# Patient Record
Sex: MALE | Race: OTHER | NOT HISPANIC OR LATINO
[De-identification: names, ages, dates, MRNs, and addresses within clinical notes are randomized per-mention and may not be internally consistent; named-entity substitution may affect disease eponyms.]

---

## 2017-10-24 ENCOUNTER — APPOINTMENT (OUTPATIENT)
Dept: ENDOCRINOLOGY | Facility: CLINIC | Age: 82
End: 2017-10-24
Payer: MEDICARE

## 2017-10-24 VITALS
SYSTOLIC BLOOD PRESSURE: 116 MMHG | WEIGHT: 189.25 LBS | HEIGHT: 68 IN | HEART RATE: 60 BPM | BODY MASS INDEX: 28.68 KG/M2 | DIASTOLIC BLOOD PRESSURE: 74 MMHG

## 2017-10-24 DIAGNOSIS — I72.3 ANEURYSM OF ILIAC ARTERY: ICD-10-CM

## 2017-10-24 PROCEDURE — 99204 OFFICE O/P NEW MOD 45 MIN: CPT | Mod: 25

## 2017-10-24 PROCEDURE — 36415 COLL VENOUS BLD VENIPUNCTURE: CPT

## 2017-11-03 LAB
24R-OH-CALCIDIOL SERPL-MCNC: 48.6 PG/ML
25(OH)D3 SERPL-MCNC: 30.8 NG/ML
ALBUMIN SERPL ELPH-MCNC: 4 G/DL
ALP BLD-CCNC: 104 U/L
ALP BONE SERPL-MCNC: 26 MCG/L
ALT SERPL-CCNC: 17 U/L
ANION GAP SERPL CALC-SCNC: 13 MMOL/L
AST SERPL-CCNC: 24 U/L
BILIRUB SERPL-MCNC: 0.3 MG/DL
BUN SERPL-MCNC: 40 MG/DL
CALCIUM SERPL-MCNC: 10.8 MG/DL
CALCIUM SERPL-MCNC: 10.8 MG/DL
CHLORIDE SERPL-SCNC: 105 MMOL/L
CO2 SERPL-SCNC: 22 MMOL/L
CREAT SERPL-MCNC: 1.5 MG/DL
GLUCOSE SERPL-MCNC: 86 MG/DL
MAGNESIUM SERPL-MCNC: 2.3 MG/DL
PARATHYROID HORMONE INTACT: 94 PG/ML
PHOSPHATE SERPL-MCNC: 3 MG/DL
POTASSIUM SERPL-SCNC: 4.8 MMOL/L
PROT SERPL-MCNC: 7.5 G/DL
PTH RELATED PROT SERPL-MCNC: <1.1 PMOL/L
SODIUM SERPL-SCNC: 140 MMOL/L
T3 SERPL-MCNC: 107 NG/DL
T4 FREE SERPL-MCNC: 1.1 NG/DL
TSH SERPL-ACNC: 2.34 UIU/ML

## 2017-12-14 ENCOUNTER — FORM ENCOUNTER (OUTPATIENT)
Age: 82
End: 2017-12-14

## 2017-12-15 ENCOUNTER — OUTPATIENT (OUTPATIENT)
Dept: OUTPATIENT SERVICES | Facility: HOSPITAL | Age: 82
LOS: 1 days | End: 2017-12-15
Payer: MEDICARE

## 2017-12-15 PROCEDURE — 77081 DXA BONE DENSITY APPENDICULR: CPT

## 2017-12-15 PROCEDURE — 77080 DXA BONE DENSITY AXIAL: CPT

## 2017-12-15 PROCEDURE — 77080 DXA BONE DENSITY AXIAL: CPT | Mod: 26

## 2017-12-20 ENCOUNTER — LABORATORY RESULT (OUTPATIENT)
Age: 82
End: 2017-12-20

## 2017-12-21 ENCOUNTER — APPOINTMENT (OUTPATIENT)
Dept: NEPHROLOGY | Facility: CLINIC | Age: 82
End: 2017-12-21
Payer: MEDICARE

## 2017-12-21 ENCOUNTER — APPOINTMENT (OUTPATIENT)
Dept: ENDOCRINOLOGY | Facility: CLINIC | Age: 82
End: 2017-12-21
Payer: MEDICARE

## 2017-12-21 VITALS — SYSTOLIC BLOOD PRESSURE: 109 MMHG | DIASTOLIC BLOOD PRESSURE: 66 MMHG

## 2017-12-21 VITALS — DIASTOLIC BLOOD PRESSURE: 56 MMHG | HEART RATE: 67 BPM | SYSTOLIC BLOOD PRESSURE: 95 MMHG

## 2017-12-21 VITALS
DIASTOLIC BLOOD PRESSURE: 76 MMHG | HEART RATE: 54 BPM | WEIGHT: 193 LBS | BODY MASS INDEX: 29.35 KG/M2 | SYSTOLIC BLOOD PRESSURE: 128 MMHG

## 2017-12-21 VITALS — SYSTOLIC BLOOD PRESSURE: 99 MMHG | HEART RATE: 66 BPM | DIASTOLIC BLOOD PRESSURE: 56 MMHG

## 2017-12-21 PROCEDURE — 99204 OFFICE O/P NEW MOD 45 MIN: CPT | Mod: 25

## 2017-12-21 PROCEDURE — 99214 OFFICE O/P EST MOD 30 MIN: CPT

## 2017-12-21 PROCEDURE — 36415 COLL VENOUS BLD VENIPUNCTURE: CPT

## 2017-12-22 LAB
25(OH)D3 SERPL-MCNC: 32.6 NG/ML
ALBUMIN SERPL ELPH-MCNC: 4.2 G/DL
ALP BLD-CCNC: 103 U/L
ALT SERPL-CCNC: 17 U/L
ANION GAP SERPL CALC-SCNC: 15 MMOL/L
APPEARANCE: CLEAR
AST SERPL-CCNC: 21 U/L
BASOPHILS # BLD AUTO: 0.01 K/UL
BASOPHILS NFR BLD AUTO: 0.2 %
BILIRUB SERPL-MCNC: 0.4 MG/DL
BILIRUBIN URINE: NEGATIVE
BLOOD URINE: NEGATIVE
BUN SERPL-MCNC: 38 MG/DL
CALCIUM SERPL-MCNC: 10.4 MG/DL
CALCIUM SERPL-MCNC: 10.4 MG/DL
CHLORIDE SERPL-SCNC: 106 MMOL/L
CO2 SERPL-SCNC: 20 MMOL/L
COLOR: YELLOW
CREAT SERPL-MCNC: 1.82 MG/DL
CREAT SPEC-SCNC: 108 MG/DL
EOSINOPHIL # BLD AUTO: 0.19 K/UL
EOSINOPHIL NFR BLD AUTO: 3.8
GLUCOSE QUALITATIVE U: NEGATIVE MG/DL
GLUCOSE SERPL-MCNC: 98 MG/DL
HCT VFR BLD CALC: 39 %
HGB BLD-MCNC: 12.4 G/DL
IMM GRANULOCYTES NFR BLD AUTO: 0.2 %
KETONES URINE: NEGATIVE
LEUKOCYTE ESTERASE URINE: NEGATIVE
LYMPHOCYTES # BLD AUTO: 0.92 K/UL
LYMPHOCYTES NFR BLD AUTO: 18.4 %
M PROTEIN SPEC IFE-MCNC: NORMAL
MAGNESIUM SERPL-MCNC: 2.3 MG/DL
MAN DIFF?: NORMAL
MCHC RBC-ENTMCNC: 29.6 PG
MCHC RBC-ENTMCNC: 31.8 GM/DL
MCV RBC AUTO: 93.1 FL
MICROALBUMIN 24H UR DL<=1MG/L-MCNC: 5.7 MG/DL
MICROALBUMIN/CREAT 24H UR-RTO: 53 MG/G
MONOCYTES # BLD AUTO: 0.51 K/UL
MONOCYTES NFR BLD AUTO: 10.2 %
NEUTROPHILS # BLD AUTO: 3.35 K/UL
NEUTROPHILS NFR BLD AUTO: 67.2 %
NITRITE URINE: NEGATIVE
PARATHYROID HORMONE INTACT: 100 PG/ML
PH URINE: 5
PHOSPHATE SERPL-MCNC: 2.6 MG/DL
PLATELET # BLD AUTO: 190 K/UL
POTASSIUM SERPL-SCNC: 4.7 MMOL/L
PROT SERPL-MCNC: 7.1 G/DL
PROTEIN URINE: NEGATIVE MG/DL
RBC # BLD: 4.19 M/UL
RBC # FLD: 14.1 %
SODIUM SERPL-SCNC: 141 MMOL/L
SPECIFIC GRAVITY URINE: 1.02
URATE SERPL-MCNC: 7.4 MG/DL
UROBILINOGEN URINE: NEGATIVE MG/DL
VIT B12 SERPL-MCNC: 442 PG/ML
WBC # FLD AUTO: 4.99 K/UL

## 2017-12-23 LAB — IGA 24H UR QL IFE: NORMAL

## 2018-01-09 ENCOUNTER — APPOINTMENT (OUTPATIENT)
Dept: NEPHROLOGY | Facility: CLINIC | Age: 83
End: 2018-01-09

## 2018-12-05 ENCOUNTER — APPOINTMENT (OUTPATIENT)
Dept: NEPHROLOGY | Facility: CLINIC | Age: 83
End: 2018-12-05
Payer: MEDICARE

## 2018-12-05 VITALS — SYSTOLIC BLOOD PRESSURE: 123 MMHG | DIASTOLIC BLOOD PRESSURE: 72 MMHG

## 2018-12-05 VITALS — SYSTOLIC BLOOD PRESSURE: 107 MMHG | HEART RATE: 54 BPM | DIASTOLIC BLOOD PRESSURE: 56 MMHG

## 2018-12-05 PROCEDURE — 99214 OFFICE O/P EST MOD 30 MIN: CPT

## 2019-06-11 ENCOUNTER — APPOINTMENT (OUTPATIENT)
Dept: NEPHROLOGY | Facility: CLINIC | Age: 84
End: 2019-06-11
Payer: MEDICARE

## 2019-06-11 VITALS — SYSTOLIC BLOOD PRESSURE: 98 MMHG | DIASTOLIC BLOOD PRESSURE: 72 MMHG | HEART RATE: 59 BPM

## 2019-06-11 PROCEDURE — 99214 OFFICE O/P EST MOD 30 MIN: CPT | Mod: 25

## 2019-06-11 RX ORDER — APIXABAN 2.5 MG/1
2.5 TABLET, FILM COATED ORAL
Refills: 0 | Status: ACTIVE | COMMUNITY

## 2019-06-11 NOTE — HISTORY OF PRESENT ILLNESS
[FreeTextEntry1] : * Labs reviewed from 5/28. CKD stable, creatinine 1.65. * HTN controlled. No lightheadedness. Compliant with medications. Lisinopril had been increased to 10 mg.* In April 2019 he had a CVA and was started on eliquis. This was not reportedly caused by a berry aneurysm. He has slight aphasia. \par \par Previous history (42Prx24): * Labs from [facility] reviewed. Creatinine 1.62 in 11/18. * Borderline hypercalcemia, calcium 10.5. 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 * HTN controlled. No lightheadedness. Compliant with medications. Following low salt diet.\par \par Previous history (63Rwx19): Referred by Dr. Gardiner for CKD. Found to have creatinine of 1.5 (GFR 43) on October 24, 2017. No NSAIDS. 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 A CT scan in 2015 revealed bilateral enlarged kidneys with innumerable cysts. His uncles (father's side) and nieces have had a similar issue. He is unsure whether his father had kidney disease. * His PTH was 94 with a calcium of 10.8 and he is being evaluated by Dr. Gardiner and Dr. Cope. HTN controlled on lisinopril 20. No lightheadedness. \par \par PCP: Dr. Chavez Lopez

## 2019-06-11 NOTE — PHYSICAL EXAM
[General Appearance - Alert] : alert [General Appearance - In No Acute Distress] : in no acute distress [Sclera] : the sclera and conjunctiva were normal [PERRL With Normal Accommodation] : pupils were equal in size, round, and reactive to light [Oropharynx] : the oropharynx was normal [Outer Ear] : the ears and nose were normal in appearance [Extraocular Movements] : extraocular movements were intact [Neck Appearance] : the appearance of the neck was normal [Neck Cervical Mass (___cm)] : no neck mass was observed [Jugular Venous Distention Increased] : there was no jugular-venous distention [Thyroid Diffuse Enlargement] : the thyroid was not enlarged [Thyroid Nodule] : there were no palpable thyroid nodules [Auscultation Breath Sounds / Voice Sounds] : lungs were clear to auscultation bilaterally [Heart Rate And Rhythm] : heart rate was normal and rhythm regular [Heart Sounds] : normal S1 and S2 [Heart Sounds Pericardial Friction Rub] : no pericardial rub [Heart Sounds Gallop] : no gallops [Murmurs] : no murmurs [Edema] : there was no peripheral edema [Bowel Sounds] : normal bowel sounds [Veins - Varicosity Changes] : there were no varicosital changes [Abdomen Soft] : soft [Abdomen Mass (___ Cm)] : no abdominal mass palpated [Abdomen Tenderness] : non-tender [Cervical Lymph Nodes Enlarged Anterior Bilaterally] : anterior cervical [Cervical Lymph Nodes Enlarged Posterior Bilaterally] : posterior cervical [Supraclavicular Lymph Nodes Enlarged Bilaterally] : supraclavicular [Nail Clubbing] : no clubbing  or cyanosis of the fingernails [Abnormal Walk] : normal gait [Motor Tone] : muscle strength and tone were normal [Skin Color & Pigmentation] : normal skin color and pigmentation [Musculoskeletal - Swelling] : no joint swelling seen [Skin Turgor] : normal skin turgor [] : no rash [Impaired Insight] : insight and judgment were intact [Affect] : the affect was normal [Oriented To Time, Place, And Person] : oriented to person, place, and time

## 2019-06-11 NOTE — ASSESSMENT
[FreeTextEntry1] : # HTN controlled. (Below target.)\par * Decrease lisinopril to 5.\par * The patient's blood pressure was checked with the Omron [id number]-[id number] using the SPRINT trial protocol after sitting quietly in an empty room with arm supported, back supported, and feet on the floor for 5 minutes. The average of 3 readings were taken. \par * The patient has been advised to check their BP at home with an automatic arm cuff, write down the readings, and reach me directly on the phone immediately if they are persistently > 180 systolic or if SBP is less than 100 or if lightheadedness develops. They were advised to bring in all blood pressure readings and medications next visit.\par * The patient has been counseled that they have a a significant medical condition and regular office followup (at least every 4 months for now) is essential for monitoring, and that it is their responsibility to make follow up appointments.\par * The patient also has been counseled that they must never stop or change any medications without discussing this with me (or another physician). \par * A counseling information sheet has been given and they were provided sufficient time to review this sheet. All their questions were answered.\par \par # CKD stable.\par * The patient has been counseled that chronic kidney disease is a significant condition and regular office followup with me (at least every 4 months for now) is essential for monitoring, and that it is their responsibility to make a follow up appointment.\par * The patient has been counseled never to stop taking their medications without discussing it with me or another doctor.\par * The patient has been counseled on risk of acute renal failure and instructed to immediately call and speak with me or go immediately to ER with any severe symptoms, nausea, vomiting, diarrhea, chest pain, or shortness of breath.\par * The patient has been counseled on avoiding NSAIDs.\par * Reducing or avoiding red meat, following a relatively low oxalate diet, and starting folate 800 mg qd has been advised.\par * A counseling information sheet has been given and they were provided sufficient time to review this. All their questions were answered.\par \par # CVA.\par * Follow up with neurology.\par * Eliquis continued. \par \par # Hyperpara.\par * Recheck PTH next visit.

## 2019-06-11 NOTE — CONSULT LETTER
[FreeTextEntry1] : Dear Chavez,\par \par I had the pleasure of seeing Kenan Mcmahon in followup for kidney disease. My note is attached.\par \par Thank you for the opportunity to participate in the care of your patient.\par \par Please call me on my cell phone at [phone] or in the office at [phone] if you have any questions.\par \par Best regards,\par \par Fran\par \par Fran Avalos MD, FACP, FASN\par [email or web address]\par Office: [phone]\par Mobile: [phone]

## 2019-10-10 ENCOUNTER — APPOINTMENT (OUTPATIENT)
Dept: NEPHROLOGY | Facility: CLINIC | Age: 84
End: 2019-10-10
Payer: MEDICARE

## 2019-10-10 VITALS — SYSTOLIC BLOOD PRESSURE: 112 MMHG | DIASTOLIC BLOOD PRESSURE: 63 MMHG

## 2019-10-10 VITALS — DIASTOLIC BLOOD PRESSURE: 56 MMHG | SYSTOLIC BLOOD PRESSURE: 103 MMHG | HEART RATE: 54 BPM

## 2019-10-10 PROCEDURE — 99214 OFFICE O/P EST MOD 30 MIN: CPT

## 2019-10-10 NOTE — HISTORY OF PRESENT ILLNESS
[FreeTextEntry1] : He is here with his wife who was also provided cousneling. \par \par * CKD slowly progressive, creatinine 1.84 on [id number]. * HTN controlled. No lightheadedness. Compliant with medications. * Calcium decreased to 10. . \par \par Previous history (11Jun19): * Labs reviewed from 5/28. CKD stable, creatinine 1.65. * HTN controlled. No lightheadedness. Compliant with medications. Lisinopril had been increased to 10 mg.* In April 2019 he had a CVA and was started on eliquis. This was not reportedly caused by a berry aneurysm. He has slight aphasia. \par \par Previous history (37Vzf57): * Labs from [facility] reviewed. Creatinine 1.62 in 11/18. * Borderline hypercalcemia, calcium 10.5. 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 * HTN controlled. No lightheadedness. Compliant with medications. Following low salt diet.\par \par Previous history (70Zvn50): Referred by Dr. Gardiner for CKD. Found to have creatinine of 1.5 (GFR 43) on October 24, 2017. No NSAIDS. 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 A CT scan in 2015 revealed bilateral enlarged kidneys with innumerable cysts. His uncles (father's side) and nieces have had a similar issue. He is unsure whether his father had kidney disease. * His PTH was 94 with a calcium of 10.8 and he is being evaluated by Dr. Gardiner and Dr. Cope. HTN controlled on lisinopril 20. No lightheadedness. \par \par PCP: Dr. Chavez Lopez

## 2019-10-10 NOTE — PHYSICAL EXAM
[General Appearance - Alert] : alert [General Appearance - In No Acute Distress] : in no acute distress [Sclera] : the sclera and conjunctiva were normal [PERRL With Normal Accommodation] : pupils were equal in size, round, and reactive to light [Extraocular Movements] : extraocular movements were intact [Outer Ear] : the ears and nose were normal in appearance [Oropharynx] : the oropharynx was normal [Neck Appearance] : the appearance of the neck was normal [Neck Cervical Mass (___cm)] : no neck mass was observed [Jugular Venous Distention Increased] : there was no jugular-venous distention [Thyroid Diffuse Enlargement] : the thyroid was not enlarged [Thyroid Nodule] : there were no palpable thyroid nodules [Auscultation Breath Sounds / Voice Sounds] : lungs were clear to auscultation bilaterally [Heart Rate And Rhythm] : heart rate was normal and rhythm regular [Heart Sounds Gallop] : no gallops [Heart Sounds] : normal S1 and S2 [Murmurs] : no murmurs [Heart Sounds Pericardial Friction Rub] : no pericardial rub [Edema] : there was no peripheral edema [Veins - Varicosity Changes] : there were no varicosital changes [Bowel Sounds] : normal bowel sounds [Abdomen Soft] : soft [Abdomen Tenderness] : non-tender [Abdomen Mass (___ Cm)] : no abdominal mass palpated [Cervical Lymph Nodes Enlarged Posterior Bilaterally] : posterior cervical [Cervical Lymph Nodes Enlarged Anterior Bilaterally] : anterior cervical [Supraclavicular Lymph Nodes Enlarged Bilaterally] : supraclavicular [Abnormal Walk] : normal gait [Nail Clubbing] : no clubbing  or cyanosis of the fingernails [Musculoskeletal - Swelling] : no joint swelling seen [Motor Tone] : muscle strength and tone were normal [Skin Turgor] : normal skin turgor [Skin Color & Pigmentation] : normal skin color and pigmentation [] : no rash [Oriented To Time, Place, And Person] : oriented to person, place, and time [Impaired Insight] : insight and judgment were intact [Affect] : the affect was normal

## 2019-10-10 NOTE — ASSESSMENT
[FreeTextEntry1] : # HTN controlled.\par * The patient's blood pressure was checked with the Omron [id number]-[id number] using the SPRINT trial protocol after sitting quietly in an empty room with arm supported, back supported, and feet on the floor for 5 minutes. The average of 3 readings were taken. \par * The patient has been advised to check their BP at home with an automatic arm cuff, write down the readings, and reach me directly on the phone immediately if they are persistently > 180 systolic or if SBP is less than 100 or if lightheadedness develops. They were advised to bring in all blood pressure readings and medications next visit.\par * The patient has been counseled that they have a a significant medical condition and regular office followup (at least every 4 months for now) is essential for monitoring, and that it is their responsibility to make follow up appointments.\par * The patient also has been counseled that they must never stop or change any medications without discussing this with me (or another physician). \par * A counseling information sheet has been given and they were provided sufficient time to review this sheet. All their questions were answered.\par \par # CKD stage 3 stable.\par * The patient has been counseled that chronic kidney disease is a significant condition and regular office followup with me (at least every 4 months for now) is essential for monitoring, and that it is their responsibility to make a follow up appointment.\par * The patient has been counseled never to stop taking their medications without discussing it with me or another doctor.\par * The patient has been counseled on risk of acute renal failure and instructed to immediately call and speak with me or go immediately to ER with any severe symptoms, nausea, vomiting, diarrhea, chest pain, or shortness of breath.\par * The patient has been counseled on avoiding NSAIDs.\par * Reducing or avoiding red meat, following a relatively low oxalate diet, and starting folate 800 mg qd has been advised.\par * A counseling information sheet has been given and they were provided sufficient time to review this. All their questions were answered.\par \par # Hyperparathyroidism.\par * Follow PTH.

## 2020-01-27 ENCOUNTER — APPOINTMENT (OUTPATIENT)
Dept: NEPHROLOGY | Facility: CLINIC | Age: 85
End: 2020-01-27

## 2020-02-03 ENCOUNTER — APPOINTMENT (OUTPATIENT)
Dept: NEPHROLOGY | Facility: CLINIC | Age: 85
End: 2020-02-03
Payer: MEDICARE

## 2020-02-03 VITALS — DIASTOLIC BLOOD PRESSURE: 64 MMHG | SYSTOLIC BLOOD PRESSURE: 108 MMHG | HEART RATE: 50 BPM

## 2020-02-03 VITALS — WEIGHT: 180.25 LBS | BODY MASS INDEX: 27.32 KG/M2 | HEIGHT: 68 IN

## 2020-02-03 VITALS — SYSTOLIC BLOOD PRESSURE: 125 MMHG | HEART RATE: 55 BPM | DIASTOLIC BLOOD PRESSURE: 73 MMHG

## 2020-02-03 PROCEDURE — 99214 OFFICE O/P EST MOD 30 MIN: CPT

## 2020-02-03 NOTE — PHYSICAL EXAM
[General Appearance - Alert] : alert [General Appearance - In No Acute Distress] : in no acute distress [PERRL With Normal Accommodation] : pupils were equal in size, round, and reactive to light [Sclera] : the sclera and conjunctiva were normal [Extraocular Movements] : extraocular movements were intact [Oropharynx] : the oropharynx was normal [Outer Ear] : the ears and nose were normal in appearance [Neck Cervical Mass (___cm)] : no neck mass was observed [Neck Appearance] : the appearance of the neck was normal [Jugular Venous Distention Increased] : there was no jugular-venous distention [Thyroid Nodule] : there were no palpable thyroid nodules [Thyroid Diffuse Enlargement] : the thyroid was not enlarged [Heart Rate And Rhythm] : heart rate was normal and rhythm regular [Auscultation Breath Sounds / Voice Sounds] : lungs were clear to auscultation bilaterally [Murmurs] : no murmurs [Heart Sounds Gallop] : no gallops [Heart Sounds Pericardial Friction Rub] : no pericardial rub [Heart Sounds] : normal S1 and S2 [Edema] : there was no peripheral edema [Veins - Varicosity Changes] : there were no varicosital changes [Bowel Sounds] : normal bowel sounds [Abdomen Soft] : soft [Abdomen Mass (___ Cm)] : no abdominal mass palpated [Abdomen Tenderness] : non-tender [Cervical Lymph Nodes Enlarged Posterior Bilaterally] : posterior cervical [Supraclavicular Lymph Nodes Enlarged Bilaterally] : supraclavicular [Cervical Lymph Nodes Enlarged Anterior Bilaterally] : anterior cervical [Nail Clubbing] : no clubbing  or cyanosis of the fingernails [Abnormal Walk] : normal gait [Musculoskeletal - Swelling] : no joint swelling seen [Motor Tone] : muscle strength and tone were normal [Skin Color & Pigmentation] : normal skin color and pigmentation [Skin Turgor] : normal skin turgor [] : no rash [Oriented To Time, Place, And Person] : oriented to person, place, and time [Impaired Insight] : insight and judgment were intact [Affect] : the affect was normal

## 2020-02-03 NOTE — CONSULT LETTER
[FreeTextEntry1] : Dear Chavez,\par \par I had the pleasure of seeing Kenan Mcmahon in followup for kidney disease. My note is attached.\par \par Thank you for the opportunity to participate in the care of your patient.\par \par Please call me on my cell phone at [phone] or in the office at [phone] if you have any questions.\par \par Best regards,\par \par Fran\par \par Fran Avalos MD, FACP, FASN\par [email or web address]\par Office: [phone]\par Mobile: [phone]

## 2020-02-03 NOTE — ASSESSMENT
[FreeTextEntry1] : # HTN controlled.\par * The patient's blood pressure was checked with the Omron [id number]-[id number] using the SPRINT trial protocol after sitting quietly in an empty room with arm supported, back supported, and feet on the floor for 5 minutes. The average of 3 readings were taken. \par * A counseling information sheet had been given and they were provided sufficient time to review this sheet. All their questions were answered.\par * The patient has been counseled to check their BP at home with an automatic arm cuff, write down the readings, and reach me directly on the phone immediately if they are persistently > 180 systolic or if SBP is less than 100 or if lightheadedness develops. They were counseled to bring in all blood pressure readings and medications next visit.\par * The patient has been counseled that they have a a significant medical condition and regular office followup (at least every 4 months for now) is essential for monitoring, and that it is their responsibility to make follow up appointments.\par * The patient also has been counseled that they must never stop or change any medications without discussing this with me (or another physician). \par \par # CKD stage 3 stable.\par * The patient has been counseled that chronic kidney disease is a significant condition and regular office followup with me (at least every 4 months for now) is essential for monitoring, and that it is their responsibility to make a follow up appointment.\par * A counseling information sheet had been given and they were provided sufficient time to review this sheet. All their questions were answered.\par * The patient has been counseled never to stop taking their medications without discussing it with me or another doctor.\par * The patient has been counseled on risk of acute renal failure and instructed to immediately call and speak with me or go immediately to ER with any severe symptoms, nausea, vomiting, diarrhea, chest pain, or shortness of breath.\par * The patient has been counseled on avoiding NSAIDs.\par * Reducing or avoiding red meat, following a relatively low oxalate diet, and starting folate 800 mg qd has been advised.\par \par # Hyperparathyroidism.\par * Follow PTH.

## 2020-02-03 NOTE — HISTORY OF PRESENT ILLNESS
[FreeTextEntry1] : He is here with his wife who was also provided counseling.\par \par * CKD stable, creatinine 1.7 on Jan 21. * HTN controlled. No lightheadedness. Compliant with medications. * Calcium 10.3 with . * No family history of aneurysm or stroke. Slight headache last week which resolved. \par \par Previous history (10Oct19): * CKD slowly progressive, creatinine 1.84 on [id number]. * HTN controlled. No lightheadedness. Compliant with medications. * Calcium decreased to 10. . \par \par Previous history (11Jun19): * Labs reviewed from 5/28. CKD stable, creatinine 1.65. * HTN controlled. No lightheadedness. Compliant with medications. Lisinopril had been increased to 10 mg.* In April 2019 he had a CVA and was started on eliquis. This was not reportedly caused by a berry aneurysm. He has slight aphasia. \par \par Previous history (67Fdj78): * Labs from [facility] reviewed. Creatinine 1.62 in 11/18. * Borderline hypercalcemia, calcium 10.5. 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 * HTN controlled. No lightheadedness. Compliant with medications. Following low salt diet.\par \par Previous history (00Ots76): Referred by Dr. Gardiner for CKD. Found to have creatinine of 1.5 (GFR 43) on October 24, 2017. No NSAIDS. 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 A CT scan in 2015 revealed bilateral enlarged kidneys with innumerable cysts. His uncles (father's side) and nieces have had a similar issue. He is unsure whether his father had kidney disease. * His PTH was 94 with a calcium of 10.8 and he is being evaluated by Dr. Gardiner and Dr. Cope. HTN controlled on lisinopril 20. No lightheadedness. \par \par PCP: Dr. Chavez Lopez

## 2020-03-24 ENCOUNTER — APPOINTMENT (OUTPATIENT)
Dept: SURGERY | Facility: CLINIC | Age: 85
End: 2020-03-24

## 2020-05-04 ENCOUNTER — APPOINTMENT (OUTPATIENT)
Dept: NEPHROLOGY | Facility: CLINIC | Age: 85
End: 2020-05-04
Payer: MEDICARE

## 2020-05-04 PROCEDURE — 99441: CPT | Mod: CR

## 2020-07-21 ENCOUNTER — APPOINTMENT (OUTPATIENT)
Dept: SURGERY | Facility: CLINIC | Age: 85
End: 2020-07-21
Payer: MEDICARE

## 2020-07-21 VITALS
TEMPERATURE: 97.2 F | DIASTOLIC BLOOD PRESSURE: 77 MMHG | HEIGHT: 68 IN | WEIGHT: 172 LBS | BODY MASS INDEX: 26.07 KG/M2 | OXYGEN SATURATION: 100 % | SYSTOLIC BLOOD PRESSURE: 128 MMHG | HEART RATE: 57 BPM

## 2020-07-21 DIAGNOSIS — Z86.73 PERSONAL HISTORY OF TRANSIENT ISCHEMIC ATTACK (TIA), AND CEREBRAL INFARCTION W/OUT RESIDUAL DEFICITS: ICD-10-CM

## 2020-07-21 PROCEDURE — 99204 OFFICE O/P NEW MOD 45 MIN: CPT

## 2020-07-21 NOTE — PLAN
[FreeTextEntry1] : Patient has significant  perioperative risks  given history of CAD, CKD history of CVA on Eliquis.\par discussed the options  including laparoscopic and  open  repair, the need and risks of general anesthesia  vs local  with sedation, and the need to temporarily stop the elequis. \par We also discussed the alternative of watchful waiting and observation.\par Patient and wife expressed understanding and will proceed with consultation  with PCP , cardiology, nephrology and neurology and consider the benefits of surgery vis a vis the  the risks of continued deterioration of physical mobility and pain.

## 2020-07-21 NOTE — PHYSICAL EXAM
[Normal] : affect appropriate [de-identified] :  large hernia in right inguinal hernia reducible/ umbilical hernia,/ scar in the left groin region

## 2020-07-21 NOTE — HISTORY OF PRESENT ILLNESS
[de-identified] : 85  yr old male presents with pain and a bulging mass in the  right groin  since  january. denies nausea and vomiting  but has experience a few episodes of incarceration.\par He has a history of a inguinal hernia repair  in 1981 and history of prostate cancer rxd with radiation beeds\par

## 2020-08-11 ENCOUNTER — APPOINTMENT (OUTPATIENT)
Dept: NEPHROLOGY | Facility: CLINIC | Age: 85
End: 2020-08-11
Payer: MEDICARE

## 2020-08-11 VITALS — SYSTOLIC BLOOD PRESSURE: 115 MMHG | DIASTOLIC BLOOD PRESSURE: 71 MMHG

## 2020-08-11 VITALS — SYSTOLIC BLOOD PRESSURE: 95 MMHG | DIASTOLIC BLOOD PRESSURE: 59 MMHG | HEART RATE: 50 BPM

## 2020-08-11 PROCEDURE — 99214 OFFICE O/P EST MOD 30 MIN: CPT

## 2020-08-11 NOTE — PHYSICAL EXAM
[General Appearance - Alert] : alert [General Appearance - In No Acute Distress] : in no acute distress [Sclera] : the sclera and conjunctiva were normal [PERRL With Normal Accommodation] : pupils were equal in size, round, and reactive to light [Extraocular Movements] : extraocular movements were intact [Outer Ear] : the ears and nose were normal in appearance [Oropharynx] : the oropharynx was normal [Neck Appearance] : the appearance of the neck was normal [Neck Cervical Mass (___cm)] : no neck mass was observed [Thyroid Nodule] : there were no palpable thyroid nodules [Thyroid Diffuse Enlargement] : the thyroid was not enlarged [Jugular Venous Distention Increased] : there was no jugular-venous distention [Auscultation Breath Sounds / Voice Sounds] : lungs were clear to auscultation bilaterally [Heart Rate And Rhythm] : heart rate was normal and rhythm regular [Heart Sounds] : normal S1 and S2 [Heart Sounds Gallop] : no gallops [Murmurs] : no murmurs [Heart Sounds Pericardial Friction Rub] : no pericardial rub [Edema] : there was no peripheral edema [Bowel Sounds] : normal bowel sounds [Abdomen Soft] : soft [Abdomen Tenderness] : non-tender [Abdomen Mass (___ Cm)] : no abdominal mass palpated [Cervical Lymph Nodes Enlarged Posterior Bilaterally] : posterior cervical [Cervical Lymph Nodes Enlarged Anterior Bilaterally] : anterior cervical [Supraclavicular Lymph Nodes Enlarged Bilaterally] : supraclavicular [Abnormal Walk] : normal gait [Nail Clubbing] : no clubbing  or cyanosis of the fingernails [Musculoskeletal - Swelling] : no joint swelling seen [Skin Color & Pigmentation] : normal skin color and pigmentation [Motor Tone] : muscle strength and tone were normal [Skin Turgor] : normal skin turgor [] : no rash [Impaired Insight] : insight and judgment were intact [Oriented To Time, Place, And Person] : oriented to person, place, and time [Affect] : the affect was normal

## 2020-08-13 NOTE — ASSESSMENT
[FreeTextEntry1] : # HTN controlled.\par * Asymptomatic low BP. Defer to cardiologist regarding dosing for low BP.\par * The patient's blood pressure was checked with the Omron [id number]-[id number] using the SPRINT trial protocol after sitting quietly in an empty room with arm supported, back supported, and feet on the floor for 5 minutes. The average of 3 readings were taken.\par * A counseling information sheet has been given (currently or previously, in-person or electronically). All their questions were answered.\par * The patient has been counseled to check their BP at home with an automatic arm cuff, write down the readings, and reach me directly on the phone immediately if they are persistently > 180 systolic or if SBP is less than 100 or if lightheadedness develops. They were counseled to bring in all blood pressure readings and medications next visit.\par * The patient has been counseled that they have a a significant medical condition and regular office followup (at least every 3 months for now) is essential for monitoring, and that it is their responsibility to make follow up appointments.\par * The patient also has been counseled that they must never stop or change any medications without discussing this with me (or another physician). 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 \par * The patient has been counseled that chronic kidney disease is a significant condition and regular office followup with me (at least every 3 months for now) is essential for monitoring, and that it is their responsibility to make a follow up appointment.\par * A counseling information sheet has been given (currently or previously, in-person or electronically). All their questions were answered.\par * The patient has been counseled never to stop taking their medications without discussing it with me or another doctor.\par * The patient has been counseled on risk of acute renal failure and instructed to immediately call and speak with me or go immediately to ER with any severe symptoms, nausea, vomiting, diarrhea, chest pain, or shortness of breath.\par * The patient has been counseled on avoiding NSAIDs.\par \par # HyperPTH. \par * Follow up calcium next visit.

## 2020-08-13 NOTE — HISTORY OF PRESENT ILLNESS
[FreeTextEntry1] : He is here with his wife who was also provided counseling.\par \par * HTN controlled. No lightheadedness. Lisinopril decreased to 2.5 by cardiologist. * CKD stable, creatinine 1.81. * Hypercalcium improved to 9.7. Previous . \par \par *Previous history (30Zvu24):  CKD stable, creatinine 1.7 on Jan 21. * HTN controlled. No lightheadedness. Compliant with medications. * Calcium 10.3 with . * No family history of aneurysm or stroke. Slight headache last week which resolved. \par \par Previous history (10Oct19): * CKD slowly progressive, creatinine 1.84 on [id number]. * HTN controlled. No lightheadedness. Compliant with medications. * Calcium decreased to 10. . \par \par Previous history (11Jun19): * Labs reviewed from 5/28. CKD stable, creatinine 1.65. * HTN controlled. No lightheadedness. Compliant with medications. Lisinopril had been increased to 10 mg.* In April 2019 he had a CVA and was started on eliquis. This was not reportedly caused by a berry aneurysm. He has slight aphasia. \par \par Previous history (80Hfq44): * Labs from [facility] reviewed. Creatinine 1.62 in 11/18. * Borderline hypercalcemia, calcium 10.5. 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 * HTN controlled. No lightheadedness. Compliant with medications. Following low salt diet.\par \par Previous history (12Hjl41): Referred by Dr. Gardiner for CKD. Found to have creatinine of 1.5 (GFR 43) on October 24, 2017. No NSAIDS. 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 A CT scan in 2015 revealed bilateral enlarged kidneys with innumerable cysts. His uncles (father's side) and nieces have had a similar issue. He is unsure whether his father had kidney disease. * His PTH was 94 with a calcium of 10.8 and he is being evaluated by Dr. Gardiner and Dr. Cope. HTN controlled on lisinopril 20. No lightheadedness. \par \par PCP: Dr. Chavez Lopez

## 2020-08-14 ENCOUNTER — LABORATORY RESULT (OUTPATIENT)
Age: 85
End: 2020-08-14

## 2020-08-14 VITALS
DIASTOLIC BLOOD PRESSURE: 72 MMHG | OXYGEN SATURATION: 99 % | HEIGHT: 71 IN | RESPIRATION RATE: 16 BRPM | HEART RATE: 53 BPM | WEIGHT: 165.57 LBS | TEMPERATURE: 98 F | SYSTOLIC BLOOD PRESSURE: 113 MMHG

## 2020-08-14 NOTE — ASU PATIENT PROFILE, ADULT - PSH
AICD (automatic cardioverter/defibrillator) present  [employer]  Elective surgery  abdominal aortic stent  H/O hernia repair

## 2020-08-14 NOTE — ASU PATIENT PROFILE, ADULT - PMH
Aneurysm of abdominal vessel    CAD (coronary artery disease)    CKD (chronic kidney disease), stage III    Confused  at night  CVA (cerebral vascular accident)  2019  H/O prostate cancer    HLD (hyperlipidemia)    HTN (hypertension)    Hyperparathyroidism    Polycystic kidney

## 2020-08-14 NOTE — ASU PATIENT PROFILE, ADULT - REASON FOR ADMISSION, PROFILE
Detail Level: Zone
Patient Specific Counseling (Will Not Stick From Patient To Patient): Patient is to start on OTC Clotrimazole cream apply daily
right inguinal hernia repair

## 2020-08-16 ENCOUNTER — TRANSCRIPTION ENCOUNTER (OUTPATIENT)
Age: 85
End: 2020-08-16

## 2020-08-17 ENCOUNTER — INPATIENT (INPATIENT)
Facility: HOSPITAL | Age: 85
LOS: 11 days | Discharge: HOME HEALTH | DRG: 335 | End: 2020-08-29
Attending: SURGERY | Admitting: SURGERY
Payer: MEDICARE

## 2020-08-17 ENCOUNTER — RESULT REVIEW (OUTPATIENT)
Age: 85
End: 2020-08-17

## 2020-08-17 ENCOUNTER — APPOINTMENT (OUTPATIENT)
Dept: SURGERY | Facility: HOSPITAL | Age: 85
End: 2020-08-17

## 2020-08-17 DIAGNOSIS — E21.3 HYPERPARATHYROIDISM, UNSPECIFIED: ICD-10-CM

## 2020-08-17 DIAGNOSIS — N17.0 ACUTE KIDNEY FAILURE WITH TUBULAR NECROSIS: ICD-10-CM

## 2020-08-17 DIAGNOSIS — Z85.46 PERSONAL HISTORY OF MALIGNANT NEOPLASM OF PROSTATE: ICD-10-CM

## 2020-08-17 DIAGNOSIS — I72.3 ANEURYSM OF ILIAC ARTERY: ICD-10-CM

## 2020-08-17 DIAGNOSIS — S63.114A DISLOCATION OF METACARPOPHALANGEAL JOINT OF RIGHT THUMB, INITIAL ENCOUNTER: ICD-10-CM
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
## 2020-08-17 DIAGNOSIS — F03.90 UNSPECIFIED DEMENTIA, UNSPECIFIED SEVERITY, WITHOUT BEHAVIORAL DISTURBANCE, PSYCHOTIC DISTURBANCE, MOOD DISTURBANCE, AND ANXIETY: ICD-10-CM

## 2020-08-17 DIAGNOSIS — I48.91 UNSPECIFIED ATRIAL FIBRILLATION: ICD-10-CM

## 2020-08-17 DIAGNOSIS — Q61.3 POLYCYSTIC KIDNEY, UNSPECIFIED: ICD-10-CM

## 2020-08-17 DIAGNOSIS — I12.9 HYPERTENSIVE CHRONIC KIDNEY DISEASE WITH STAGE 1 THROUGH STAGE 4 CHRONIC KIDNEY DISEASE, OR UNSPECIFIED CHRONIC KIDNEY DISEASE: ICD-10-CM

## 2020-08-17 DIAGNOSIS — I47.2 VENTRICULAR TACHYCARDIA: ICD-10-CM

## 2020-08-17 DIAGNOSIS — Z86.73 PERSONAL HISTORY OF TRANSIENT ISCHEMIC ATTACK (TIA), AND CEREBRAL INFARCTION WITHOUT RESIDUAL DEFICITS: ICD-10-CM
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
## 2020-08-17 DIAGNOSIS — Z79.01 LONG TERM (CURRENT) USE OF ANTICOAGULANTS: ICD-10-CM

## 2020-08-17 DIAGNOSIS — I70.309 UNSPECIFIED ATHEROSCLEROSIS OF UNSPECIFIED TYPE OF BYPASS GRAFT(S) OF THE EXTREMITIES, UNSPECIFIED EXTREMITY: Chronic | ICD-10-CM

## 2020-08-17 DIAGNOSIS — K40.90 UNILATERAL INGUINAL HERNIA, WITHOUT OBSTRUCTION OR GANGRENE, NOT SPECIFIED AS RECURRENT: ICD-10-CM

## 2020-08-17 DIAGNOSIS — E78.5 HYPERLIPIDEMIA, UNSPECIFIED: ICD-10-CM
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
## 2020-08-17 DIAGNOSIS — R71.0 PRECIPITOUS DROP IN HEMATOCRIT: ICD-10-CM

## 2020-08-17 DIAGNOSIS — Z95.810 PRESENCE OF AUTOMATIC (IMPLANTABLE) CARDIAC DEFIBRILLATOR: ICD-10-CM

## 2020-08-17 DIAGNOSIS — I25.10 ATHEROSCLEROTIC HEART DISEASE OF NATIVE CORONARY ARTERY WITHOUT ANGINA PECTORIS: ICD-10-CM
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
## 2020-08-17 DIAGNOSIS — I95.81 POSTPROCEDURAL HYPOTENSION: ICD-10-CM

## 2020-08-17 DIAGNOSIS — T81.19XA OTHER POSTPROCEDURAL SHOCK, INITIAL ENCOUNTER: ICD-10-CM

## 2020-08-17 DIAGNOSIS — R19.7 DIARRHEA, UNSPECIFIED: ICD-10-CM

## 2020-08-17 DIAGNOSIS — L76.32 POSTPROCEDURAL HEMATOMA OF SKIN AND SUBCUTANEOUS TISSUE FOLLOWING OTHER PROCEDURE: ICD-10-CM

## 2020-08-17 DIAGNOSIS — N18.3 CHRONIC KIDNEY DISEASE, STAGE 3 (MODERATE): ICD-10-CM

## 2020-08-17 DIAGNOSIS — Z98.890 OTHER SPECIFIED POSTPROCEDURAL STATES: Chronic | ICD-10-CM

## 2020-08-17 DIAGNOSIS — E87.2 ACIDOSIS: ICD-10-CM

## 2020-08-17 DIAGNOSIS — Z95.810 PRESENCE OF AUTOMATIC (IMPLANTABLE) CARDIAC DEFIBRILLATOR: Chronic | ICD-10-CM

## 2020-08-17 DIAGNOSIS — F05 DELIRIUM DUE TO KNOWN PHYSIOLOGICAL CONDITION: ICD-10-CM

## 2020-08-17 DIAGNOSIS — R33.9 RETENTION OF URINE, UNSPECIFIED: ICD-10-CM

## 2020-08-17 LAB
ANION GAP SERPL CALC-SCNC: 20 MMOL/L — HIGH (ref 5–17)
BASE EXCESS BLDA CALC-SCNC: -7.7 MMOL/L — LOW (ref -2–3)
BUN SERPL-MCNC: 34 MG/DL — HIGH (ref 7–23)
CA-I BLDA-SCNC: 1.11 MMOL/L — LOW (ref 1.12–1.3)
CALCIUM SERPL-MCNC: 7.8 MG/DL — LOW (ref 8.4–10.5)
CHLORIDE SERPL-SCNC: 105 MMOL/L — SIGNIFICANT CHANGE UP (ref 96–108)
CK MB CFR SERPL CALC: <1 NG/ML — SIGNIFICANT CHANGE UP (ref 0–6.7)
CK SERPL-CCNC: 22 U/L — LOW (ref 30–200)
CO2 SERPL-SCNC: 11 MMOL/L — LOW (ref 22–31)
COHGB MFR BLDA: 0.3 % — SIGNIFICANT CHANGE UP
CREAT SERPL-MCNC: 1.17 MG/DL — SIGNIFICANT CHANGE UP (ref 0.5–1.3)
GLUCOSE BLDC GLUCOMTR-MCNC: 105 MG/DL — HIGH (ref 70–99)
GLUCOSE SERPL-MCNC: 101 MG/DL — HIGH (ref 70–99)
HCO3 BLDA-SCNC: 19 MMOL/L — LOW (ref 21–28)
HCT VFR BLD CALC: 14.3 % — CRITICAL LOW (ref 39–50)
HGB BLD-MCNC: 4.5 G/DL — CRITICAL LOW (ref 13–17)
HGB BLDA-MCNC: 10.1 G/DL — LOW (ref 13–17)
MAGNESIUM SERPL-MCNC: 1.1 MG/DL — LOW (ref 1.6–2.6)
MCHC RBC-ENTMCNC: 29.8 PG — SIGNIFICANT CHANGE UP (ref 27–34)
MCHC RBC-ENTMCNC: 31.5 GM/DL — LOW (ref 32–36)
MCV RBC AUTO: 94.7 FL — SIGNIFICANT CHANGE UP (ref 80–100)
METHGB MFR BLDA: 0.3 % — SIGNIFICANT CHANGE UP
NRBC # BLD: 0 /100 WBCS — SIGNIFICANT CHANGE UP (ref 0–0)
O2 CT VFR BLDA CALC: 14.9 ML/DL — SIGNIFICANT CHANGE UP (ref 15–23)
OXYHGB MFR BLDA: 98 % — SIGNIFICANT CHANGE UP (ref 94–100)
PCO2 BLDA: 41 MMHG — SIGNIFICANT CHANGE UP (ref 35–48)
PH BLDA: 7.27 — LOW (ref 7.35–7.45)
PHOSPHATE SERPL-MCNC: 2.4 MG/DL — LOW (ref 2.5–4.5)
PLATELET # BLD AUTO: 110 K/UL — LOW (ref 150–400)
PO2 BLDA: 369 MMHG — HIGH (ref 83–108)
POTASSIUM BLDA-SCNC: 5.2 MMOL/L — HIGH (ref 3.5–4.9)
POTASSIUM SERPL-MCNC: 4.5 MMOL/L — SIGNIFICANT CHANGE UP (ref 3.5–5.3)
POTASSIUM SERPL-SCNC: 4.5 MMOL/L — SIGNIFICANT CHANGE UP (ref 3.5–5.3)
RBC # BLD: 1.51 M/UL — LOW (ref 4.2–5.8)
RBC # FLD: 13.3 % — SIGNIFICANT CHANGE UP (ref 10.3–14.5)
SAO2 % BLDA: 99 % — SIGNIFICANT CHANGE UP (ref 95–100)
SODIUM BLDA-SCNC: 135 MMOL/L — LOW (ref 138–146)
SODIUM SERPL-SCNC: 136 MMOL/L — SIGNIFICANT CHANGE UP (ref 135–145)
TROPONIN T SERPL-MCNC: <0.01 NG/ML — SIGNIFICANT CHANGE UP (ref 0–0.01)
WBC # BLD: 8.46 K/UL — SIGNIFICANT CHANGE UP (ref 3.8–10.5)
WBC # FLD AUTO: 8.46 K/UL — SIGNIFICANT CHANGE UP (ref 3.8–10.5)

## 2020-08-17 PROCEDURE — 49505 PRP I/HERN INIT REDUC >5 YR: CPT | Mod: RT,GC

## 2020-08-17 PROCEDURE — 49322 LAPAROSCOPY ASPIRATION: CPT | Mod: 78,GC

## 2020-08-17 PROCEDURE — 88304 TISSUE EXAM BY PATHOLOGIST: CPT | Mod: 26

## 2020-08-17 PROCEDURE — 49002 REOPENING OF ABDOMEN: CPT | Mod: 78,GC

## 2020-08-17 PROCEDURE — 88302 TISSUE EXAM BY PATHOLOGIST: CPT | Mod: 26

## 2020-08-17 RX ORDER — BUPIVACAINE 13.3 MG/ML
20 INJECTION, SUSPENSION, LIPOSOMAL INFILTRATION ONCE
Refills: 0 | Status: DISCONTINUED | OUTPATIENT
Start: 2020-08-17 | End: 2020-08-18

## 2020-08-17 RX ORDER — METOCLOPRAMIDE HCL 10 MG
10 TABLET ORAL EVERY 6 HOURS
Refills: 0 | Status: DISCONTINUED | OUTPATIENT
Start: 2020-08-17 | End: 2020-08-18

## 2020-08-17 RX ORDER — ATORVASTATIN CALCIUM 80 MG/1
80 TABLET, FILM COATED ORAL DAILY
Refills: 0 | Status: DISCONTINUED | OUTPATIENT
Start: 2020-08-18 | End: 2020-08-29

## 2020-08-17 RX ORDER — ONDANSETRON 8 MG/1
4 TABLET, FILM COATED ORAL EVERY 8 HOURS
Refills: 0 | Status: DISCONTINUED | OUTPATIENT
Start: 2020-08-17 | End: 2020-08-29

## 2020-08-17 RX ORDER — SODIUM CHLORIDE 9 MG/ML
250 INJECTION INTRAMUSCULAR; INTRAVENOUS; SUBCUTANEOUS ONCE
Refills: 0 | Status: COMPLETED | OUTPATIENT
Start: 2020-08-17 | End: 2020-08-17

## 2020-08-17 RX ORDER — ACETAMINOPHEN 500 MG
1000 TABLET ORAL EVERY 8 HOURS
Refills: 0 | Status: COMPLETED | OUTPATIENT
Start: 2020-08-17 | End: 2020-08-18

## 2020-08-17 RX ORDER — SODIUM CHLORIDE 9 MG/ML
1000 INJECTION, SOLUTION INTRAVENOUS
Refills: 0 | Status: DISCONTINUED | OUTPATIENT
Start: 2020-08-17 | End: 2020-08-18

## 2020-08-17 RX ORDER — ACETAMINOPHEN WITH CODEINE 300MG-30MG
1 TABLET ORAL EVERY 4 HOURS
Refills: 0 | Status: DISCONTINUED | OUTPATIENT
Start: 2020-08-17 | End: 2020-08-20

## 2020-08-17 RX ADMIN — Medication 1 TABLET(S): at 19:18

## 2020-08-17 RX ADMIN — SODIUM CHLORIDE 70 MILLILITER(S): 9 INJECTION, SOLUTION INTRAVENOUS at 15:50

## 2020-08-17 RX ADMIN — SODIUM CHLORIDE 500 MILLILITER(S): 9 INJECTION INTRAMUSCULAR; INTRAVENOUS; SUBCUTANEOUS at 21:22

## 2020-08-17 NOTE — H&P ADULT - NSICDXPASTSURGICALHX_GEN_ALL_CORE_FT
PAST SURGICAL HISTORY:  AICD (automatic cardioverter/defibrillator) present [employer]    Atherosclerosis of aorto-iliac bypass graft stent    H/O hernia repair     S/P AAA repair

## 2020-08-17 NOTE — H&P ADULT - ASSESSMENT
Pt is an 86 y/o M with pmhx of CKD, hx of CVA on eliquis at home which was held starting 8/15, CAD, AFib, HTN, iliac artery anuerysm, presents here today for elective procedure for repair of large right inguinal hernia. Pt was cleared by multiple specialists including nephrology, hematology, neurology, cardiology, and PCP. Pt was advised to take lovenox BID saturday and Sunday morning. Procedure will be performed open using local anesthesia given pts significant pmhx. Pt is an 86 y/o M with pmhx of CKD, hx of CVA on eliquis at home which was held starting 8/15, CAD, AFib, HTN, iliac artery anuerysm, presents here today for elective procedure for repair of large right inguinal hernia. Pt was cleared by multiple specialists including nephrology, hematology, neurology, cardiology, and PCP. Pt was advised to take lovenox BID saturday and Sunday morning. Procedure will be performed using general anesthesia as per anesthesia preop consult.

## 2020-08-17 NOTE — CHART NOTE - NSCHARTNOTEFT_GEN_A_CORE
I was contacted by pt's nurse to inform me that patient arrived on the floor with BP: 58/40 HR: 58, R: 18, T: 97.6 (rectally). She stated pt was complaining of pain.  At bedside blood pressure was taken several times from different cuff, I was contacted by pt's nurse to inform me that the patient arrived on the floor with BP: 58/40 HR: 58, R: 18, T: 97.6 (rectally). She stated pt was complaining of pain.  At bedside blood pressure was taken several times with different cuffs/sources.  Blood was obtained, CBC, BMP, Mg and Phos as well as cardiac enzymes. An EKG was performed. Pt received 500cc bolus of NS. Labs returned showing H/H: 4.5/14.3, cardiac enzymes were neg. Abdominal exam showed asymmetric swelling in the R abdominal wall, soft to touch. 2u pRBC were ordered and started immediately. Chief resident assessed patient at bedside who then called Dr. Gomez and the patient was taken urgently to the OR for an ex lap.  Pt will be monitored by the SICU after the surgery. Patient is POD 0 from an open inguinal hernia (R) through midline incision, umbilical hernia repair, and appendectomy.  I was contacted by the pt's nurse on [address] to inform me that the patient arrived on the floor from the PACU with a BP: 58/40 HR: 58, R: 18, T: 97.6 (rectally). She stated pt was complaining of pain.  At bedside blood pressure was taken several times with different cuffs/sources with little variation. Patient stated he had pain in his abdomen and denied CP and SOB.  Blood was obtained, CBC, BMP, Mg and Phos as well as cardiac enzymes. An EKG was performed. Pt received 500cc bolus of NS. Labs returned showing H/H: 4.5/14.3, cardiac enzymes were neg. Abdominal exam showed asymmetric swelling in the R abdominal wall, soft to touch. 2u pRBC were ordered and started immediately. Chief resident assessed patient at bedside who then called Dr. Gomez and the patient was taken urgently to the OR for an ex lap.  Pt will be monitored by the SICU after the surgery. Patient is POD 0 from an open inguinal hernia (R) through midline incision, umbilical hernia repair, and appendectomy.  I was contacted by the pt's nurse on [place] to inform me that the patient arrived on the floor from the PACU with a BP: 58/40 HR: 58, R: 18, T: 97.6 (rectally). She stated pt was complaining of pain.  At bedside blood pressure was taken several times with different cuffs/sources with little variation. Patient stated he had pain in his abdomen and denied CP and SOB.  Blood was obtained, CBC, BMP, Mg and Phos as well as cardiac enzymes. An EKG was performed. Pt received 500cc bolus of NS. Labs returned showing H/H: 4.5/14.3, cardiac enzymes were neg. Abdominal exam showed asymmetric swelling in the R abdominal wall, soft to touch. 2u pRBC were ordered and started immediately. Chief resident assessed patient at bedside who then called Dr. Gomez and the patient was taken urgently to the OR for an ex lap.  Pt will be monitored by the SICU after the surgery.          *****Chief resident note******  Called to bedside to evaluate patient with PA. patient was hypotensive to the 60s. Given 1L crystalloid. Sent off STAT labs which demonstrated Hgb <5. Started 2U PRBC with some improvement in BP. Added on to the OR and brought upstairs for evacuation of potential abdominal wall hematoma. Patient is POD 0 from an open inguinal hernia (R) through midline incision, umbilical hernia repair, and appendectomy.  I was contacted by the pt's nurse on 9[place]man that the patient arrived on the floor from the PACU around 19:47 with a BP of 115/63 HR 52 however by 21:05 pt's BP decreased to 58/40 HR: 58, R: 18, T: 97.6 (rectally). She stated pt was complaining of pain.  At bedside blood pressure was taken several times with different cuffs/sources with little variation. Patient stated he had pain in his abdomen and denied CP and SOB.  Blood was obtained, CBC, BMP, Mg and Phos as well as cardiac enzymes. An EKG was performed. Pt received 500cc bolus of NS. Labs returned showing H/H: 4.5/14.3, cardiac enzymes were neg. Abdominal exam showed asymmetric swelling in the R abdominal wall, soft to touch. 2u pRBC were ordered and started immediately. Chief resident assessed patient at bedside who then called Dr. Gomez and the patient was taken urgently to the OR for an ex lap.  Pt will be monitored by the SICU after the surgery.          *****Chief resident note******  Called to bedside to evaluate patient with PA. patient was hypotensive to the 60s. Given 1L crystalloid. Sent off STAT labs which demonstrated Hgb <5. Started 2U PRBC with some improvement in BP. Added on to the OR and brought upstairs for evacuation of potential abdominal wall hematoma.

## 2020-08-17 NOTE — H&P ADULT - NSICDXPASTMEDICALHX_GEN_ALL_CORE_FT
PAST MEDICAL HISTORY:  Aneurysm of abdominal vessel     Atrial fibrillation     CAD (coronary artery disease)     CKD (chronic kidney disease), stage III     Confused at night    CVA (cerebral vascular accident) 2019    H/O prostate cancer     HLD (hyperlipidemia)     HTN (hypertension)     Hyperparathyroidism     Polycystic kidney

## 2020-08-17 NOTE — PROGRESS NOTE ADULT - ASSESSMENT
Pt is an 86 y/o M with pmhx of CKD (Cr 1.59 pre-op), hx of CVA on eliquis at home which was held starting 8/15, CAD, AFib, HTN, iliac artery anuerysm, now s/p elective procedure for repair of large right inguinal hernia. Pt was straight cath'd d/t retention and large urinary volume. Pt is bradycardic but appears at baseline given pre-op HR of 50. He denied chest pain and is normotensive. Exam was otherwise wnl.    TOV @11  CLD  LR 70  Starting home Eliquis for afib tomorrow  Starting atorvastatin tomorrow  Pain/nausea control  OOBA/IS/SCDs

## 2020-08-17 NOTE — PROGRESS NOTE ADULT - SUBJECTIVE AND OBJECTIVE BOX
STATUS POST:  open inguinal hernia (R) through midline decision, umbilical hernia repair, and appendectomy    SUBJECTIVE: Pt had difficulty urinating with bladder scan showing 400cc. Straight cath'd with >400cc of output. Pt noted to be bradycardic to 40's, pre-op HR was 50. Pt denies nausea, vomiting, chest pain, SOB, and leg pain.     Vital Signs Last 24 Hrs  T(C): --  T(F): --  HR: 48 (17 Aug 2020 17:15) (44 - 49)  BP: 140/64 (17 Aug 2020 17:15) (140/64 - 162/87)  BP(mean): 97 (17 Aug 2020 15:38) (94 - 118)  RR: 20 (17 Aug 2020 17:15) (12 - 31)  SpO2: 100% (17 Aug 2020 17:15) (98% - 100%)  I&O's Summary    17 Aug 2020 07:01  -  17 Aug 2020 17:42  --------------------------------------------------------  IN: 140 mL / OUT: 400 mL / NET: -260 mL      I&O's Detail    17 Aug 2020 07:01  -  17 Aug 2020 17:42  --------------------------------------------------------  IN:    lactated ringers.: 140 mL  Total IN: 140 mL    OUT:    Intermittent Catheterization - Urethral: 400 mL  Total OUT: 400 mL    Total NET: -260 mL      LABS:      Physical exam :  General: resting in bed, no acute distress  Neuro: Difficulty hearing  CV: sinus bradycardia, normotensive  Pulm: non-labored breathing, no respiratory distress, on RA  Abdomen: soft, non-distended, TTP in lower abdomen , umbilical and midline lower abdominal incision are c/d/i.   Extremities: SCDs in place

## 2020-08-17 NOTE — PRE-OP CHECKLIST - ASSESSMENT, HISTORY & PHYSICAL COMPLETED AND ON MEDICAL RECORD
Supportive care for right now. Keep good hydration he could add some vitamin C supplement. Gargle throat with salty water as needed. Follow-up in 1 week if there is no improvement in his symptoms. done

## 2020-08-17 NOTE — H&P ADULT - HISTORY OF PRESENT ILLNESS
Pt is an 84 y/o M with pmhx of CKD, hx of CVA on eliquis at home which was held starting 8/15, CAD, AFib, HTN, iliac artery anuerysm, presents here today for elective procedure for repair of large right inguinal hernia. Pt was cleared by multiple specialists including nephrology, hematology, neurology, cardiology, and PCP. Pt was advised to take lovenox BID saturday and Sunday morning. Procedure will be performed open using local anesthesia given pts significant pmhx. Pt is an 86 y/o M with pmhx of CKD, hx of CVA on eliquis at home which was held starting 8/15, CAD, AFib, HTN, iliac artery anuerysm, presents here today for elective procedure for repair of large right inguinal hernia. Pt was cleared by multiple specialists including nephrology, hematology, neurology, cardiology, and PCP. Pt was advised to take lovenox BID saturday and Sunday morning. Procedure will be performed using general anesthesia after anesthesia preop consult.

## 2020-08-17 NOTE — BRIEF OPERATIVE NOTE - OPERATION/FINDINGS
Large Right Indirect inguinal hernia containing appendix (Amyand's) Lower midline incision made with a preperitoneal approach. Hernia sac ligated and removed after abdominal contents reduced back to peritoneal cavity. Appendectomy performed.   1.5 cm umbilical hernia defect repaired primarily Large Right Indirect inguinal hernia containing appendix (Amyand's) Lower midline incision made with a preperitoneal approach. Hernia sac ligated and removed after abdominal contents reduced back to peritoneal cavity. Hernia repair completed with PROgrip mesh. Appendectomy performed.   1.5 cm umbilical hernia defect repaired primarily

## 2020-08-18 PROBLEM — E21.3 HYPERPARATHYROIDISM, UNSPECIFIED: Chronic | Status: ACTIVE | Noted: 2020-08-14

## 2020-08-18 PROBLEM — N18.3 CHRONIC KIDNEY DISEASE, STAGE 3 (MODERATE): Chronic | Status: ACTIVE | Noted: 2020-08-14

## 2020-08-18 PROBLEM — I10 ESSENTIAL (PRIMARY) HYPERTENSION: Chronic | Status: ACTIVE | Noted: 2020-08-14

## 2020-08-18 PROBLEM — I48.91 UNSPECIFIED ATRIAL FIBRILLATION: Chronic | Status: ACTIVE | Noted: 2020-08-14

## 2020-08-18 PROBLEM — E78.5 HYPERLIPIDEMIA, UNSPECIFIED: Chronic | Status: ACTIVE | Noted: 2020-08-14

## 2020-08-18 PROBLEM — I71.4 ABDOMINAL AORTIC ANEURYSM, WITHOUT RUPTURE: Chronic | Status: ACTIVE | Noted: 2020-08-14

## 2020-08-18 PROBLEM — I25.10 ATHEROSCLEROTIC HEART DISEASE OF NATIVE CORONARY ARTERY WITHOUT ANGINA PECTORIS: Chronic | Status: ACTIVE | Noted: 2020-08-14

## 2020-08-18 PROBLEM — Q61.3 POLYCYSTIC KIDNEY, UNSPECIFIED: Chronic | Status: ACTIVE | Noted: 2020-08-14

## 2020-08-18 PROBLEM — R41.0 DISORIENTATION, UNSPECIFIED: Chronic | Status: ACTIVE | Noted: 2020-08-14

## 2020-08-18 PROBLEM — I63.9 CEREBRAL INFARCTION, UNSPECIFIED: Chronic | Status: ACTIVE | Noted: 2020-08-14

## 2020-08-18 PROBLEM — Z85.46 PERSONAL HISTORY OF MALIGNANT NEOPLASM OF PROSTATE: Chronic | Status: ACTIVE | Noted: 2020-08-14

## 2020-08-18 LAB
ANION GAP SERPL CALC-SCNC: 10 MMOL/L — SIGNIFICANT CHANGE UP (ref 5–17)
ANION GAP SERPL CALC-SCNC: 13 MMOL/L — SIGNIFICANT CHANGE UP (ref 5–17)
APTT BLD: 35.2 SEC — SIGNIFICANT CHANGE UP (ref 27.5–35.5)
BASE EXCESS BLDA CALC-SCNC: -6.4 MMOL/L — LOW (ref -2–3)
BASOPHILS # BLD AUTO: 0.01 K/UL — SIGNIFICANT CHANGE UP (ref 0–0.2)
BASOPHILS # BLD AUTO: 0.01 K/UL — SIGNIFICANT CHANGE UP (ref 0–0.2)
BASOPHILS NFR BLD AUTO: 0.1 % — SIGNIFICANT CHANGE UP (ref 0–2)
BASOPHILS NFR BLD AUTO: 0.1 % — SIGNIFICANT CHANGE UP (ref 0–2)
BUN SERPL-MCNC: 50 MG/DL — HIGH (ref 7–23)
BUN SERPL-MCNC: 51 MG/DL — HIGH (ref 7–23)
CALCIUM SERPL-MCNC: 9.1 MG/DL — SIGNIFICANT CHANGE UP (ref 8.4–10.5)
CALCIUM SERPL-MCNC: 9.5 MG/DL — SIGNIFICANT CHANGE UP (ref 8.4–10.5)
CHLORIDE SERPL-SCNC: 108 MMOL/L — SIGNIFICANT CHANGE UP (ref 96–108)
CHLORIDE SERPL-SCNC: 112 MMOL/L — HIGH (ref 96–108)
CO2 SERPL-SCNC: 18 MMOL/L — LOW (ref 22–31)
CO2 SERPL-SCNC: 18 MMOL/L — LOW (ref 22–31)
CREAT SERPL-MCNC: 1.77 MG/DL — HIGH (ref 0.5–1.3)
CREAT SERPL-MCNC: 1.79 MG/DL — HIGH (ref 0.5–1.3)
EOSINOPHIL # BLD AUTO: 0.01 K/UL — SIGNIFICANT CHANGE UP (ref 0–0.5)
EOSINOPHIL # BLD AUTO: 0.01 K/UL — SIGNIFICANT CHANGE UP (ref 0–0.5)
EOSINOPHIL NFR BLD AUTO: 0.1 % — SIGNIFICANT CHANGE UP (ref 0–6)
EOSINOPHIL NFR BLD AUTO: 0.1 % — SIGNIFICANT CHANGE UP (ref 0–6)
GAS PNL BLDA: SIGNIFICANT CHANGE UP
GLUCOSE BLDC GLUCOMTR-MCNC: 104 MG/DL — HIGH (ref 70–99)
GLUCOSE BLDC GLUCOMTR-MCNC: 129 MG/DL — HIGH (ref 70–99)
GLUCOSE BLDC GLUCOMTR-MCNC: 138 MG/DL — HIGH (ref 70–99)
GLUCOSE BLDC GLUCOMTR-MCNC: 98 MG/DL — SIGNIFICANT CHANGE UP (ref 70–99)
GLUCOSE SERPL-MCNC: 135 MG/DL — HIGH (ref 70–99)
GLUCOSE SERPL-MCNC: 166 MG/DL — HIGH (ref 70–99)
HCO3 BLDA-SCNC: 18 MMOL/L — LOW (ref 21–28)
HCT VFR BLD CALC: 27.5 % — LOW (ref 39–50)
HCT VFR BLD CALC: 27.9 % — LOW (ref 39–50)
HCT VFR BLD CALC: 28.8 % — LOW (ref 39–50)
HCT VFR BLD CALC: 29.7 % — LOW (ref 39–50)
HCT VFR BLD CALC: 31.6 % — LOW (ref 39–50)
HGB BLD-MCNC: 10.2 G/DL — LOW (ref 13–17)
HGB BLD-MCNC: 9.2 G/DL — LOW (ref 13–17)
HGB BLD-MCNC: 9.3 G/DL — LOW (ref 13–17)
HGB BLD-MCNC: 9.6 G/DL — LOW (ref 13–17)
HGB BLD-MCNC: 9.8 G/DL — LOW (ref 13–17)
IMM GRANULOCYTES NFR BLD AUTO: 0.4 % — SIGNIFICANT CHANGE UP (ref 0–1.5)
IMM GRANULOCYTES NFR BLD AUTO: 0.4 % — SIGNIFICANT CHANGE UP (ref 0–1.5)
INR BLD: 1.15 — SIGNIFICANT CHANGE UP (ref 0.88–1.16)
LACTATE SERPL-SCNC: 2.6 MMOL/L — HIGH (ref 0.5–2)
LYMPHOCYTES # BLD AUTO: 0.65 K/UL — LOW (ref 1–3.3)
LYMPHOCYTES # BLD AUTO: 0.77 K/UL — LOW (ref 1–3.3)
LYMPHOCYTES # BLD AUTO: 5.9 % — LOW (ref 13–44)
LYMPHOCYTES # BLD AUTO: 6.9 % — LOW (ref 13–44)
MAGNESIUM SERPL-MCNC: 2 MG/DL — SIGNIFICANT CHANGE UP (ref 1.6–2.6)
MCHC RBC-ENTMCNC: 28.6 PG — SIGNIFICANT CHANGE UP (ref 27–34)
MCHC RBC-ENTMCNC: 29.2 PG — SIGNIFICANT CHANGE UP (ref 27–34)
MCHC RBC-ENTMCNC: 29.2 PG — SIGNIFICANT CHANGE UP (ref 27–34)
MCHC RBC-ENTMCNC: 29.4 PG — SIGNIFICANT CHANGE UP (ref 27–34)
MCHC RBC-ENTMCNC: 29.5 PG — SIGNIFICANT CHANGE UP (ref 27–34)
MCHC RBC-ENTMCNC: 32.3 GM/DL — SIGNIFICANT CHANGE UP (ref 32–36)
MCHC RBC-ENTMCNC: 32.3 GM/DL — SIGNIFICANT CHANGE UP (ref 32–36)
MCHC RBC-ENTMCNC: 33 GM/DL — SIGNIFICANT CHANGE UP (ref 32–36)
MCHC RBC-ENTMCNC: 33.8 GM/DL — SIGNIFICANT CHANGE UP (ref 32–36)
MCHC RBC-ENTMCNC: 34 GM/DL — SIGNIFICANT CHANGE UP (ref 32–36)
MCV RBC AUTO: 86.6 FL — SIGNIFICANT CHANGE UP (ref 80–100)
MCV RBC AUTO: 86.7 FL — SIGNIFICANT CHANGE UP (ref 80–100)
MCV RBC AUTO: 87 FL — SIGNIFICANT CHANGE UP (ref 80–100)
MCV RBC AUTO: 90.3 FL — SIGNIFICANT CHANGE UP (ref 80–100)
MCV RBC AUTO: 90.5 FL — SIGNIFICANT CHANGE UP (ref 80–100)
MONOCYTES # BLD AUTO: 0.81 K/UL — SIGNIFICANT CHANGE UP (ref 0–0.9)
MONOCYTES # BLD AUTO: 0.83 K/UL — SIGNIFICANT CHANGE UP (ref 0–0.9)
MONOCYTES NFR BLD AUTO: 6.2 % — SIGNIFICANT CHANGE UP (ref 2–14)
MONOCYTES NFR BLD AUTO: 8.8 % — SIGNIFICANT CHANGE UP (ref 2–14)
NEUTROPHILS # BLD AUTO: 11.32 K/UL — HIGH (ref 1.8–7.4)
NEUTROPHILS # BLD AUTO: 7.89 K/UL — HIGH (ref 1.8–7.4)
NEUTROPHILS NFR BLD AUTO: 83.7 % — HIGH (ref 43–77)
NEUTROPHILS NFR BLD AUTO: 87.3 % — HIGH (ref 43–77)
NRBC # BLD: 0 /100 WBCS — SIGNIFICANT CHANGE UP (ref 0–0)
PCO2 BLDA: 33 MMHG — LOW (ref 35–48)
PH BLDA: 7.36 — SIGNIFICANT CHANGE UP (ref 7.35–7.45)
PHOSPHATE SERPL-MCNC: 4.6 MG/DL — HIGH (ref 2.5–4.5)
PLATELET # BLD AUTO: 105 K/UL — LOW (ref 150–400)
PLATELET # BLD AUTO: 123 K/UL — LOW (ref 150–400)
PLATELET # BLD AUTO: 138 K/UL — LOW (ref 150–400)
PLATELET # BLD AUTO: 141 K/UL — LOW (ref 150–400)
PLATELET # BLD AUTO: 143 K/UL — LOW (ref 150–400)
PO2 BLDA: 95 MMHG — SIGNIFICANT CHANGE UP (ref 83–108)
POTASSIUM SERPL-MCNC: 4.4 MMOL/L — SIGNIFICANT CHANGE UP (ref 3.5–5.3)
POTASSIUM SERPL-MCNC: 4.8 MMOL/L — SIGNIFICANT CHANGE UP (ref 3.5–5.3)
POTASSIUM SERPL-SCNC: 4.4 MMOL/L — SIGNIFICANT CHANGE UP (ref 3.5–5.3)
POTASSIUM SERPL-SCNC: 4.8 MMOL/L — SIGNIFICANT CHANGE UP (ref 3.5–5.3)
PROTHROM AB SERPL-ACNC: 13.7 SEC — HIGH (ref 10.6–13.6)
RBC # BLD: 3.16 M/UL — LOW (ref 4.2–5.8)
RBC # BLD: 3.22 M/UL — LOW (ref 4.2–5.8)
RBC # BLD: 3.29 M/UL — LOW (ref 4.2–5.8)
RBC # BLD: 3.32 M/UL — LOW (ref 4.2–5.8)
RBC # BLD: 3.49 M/UL — LOW (ref 4.2–5.8)
RBC # FLD: 14 % — SIGNIFICANT CHANGE UP (ref 10.3–14.5)
RBC # FLD: 14.2 % — SIGNIFICANT CHANGE UP (ref 10.3–14.5)
RBC # FLD: 14.6 % — HIGH (ref 10.3–14.5)
RBC # FLD: 14.7 % — HIGH (ref 10.3–14.5)
RBC # FLD: 14.9 % — HIGH (ref 10.3–14.5)
SAO2 % BLDA: 97 % — SIGNIFICANT CHANGE UP (ref 95–100)
SODIUM SERPL-SCNC: 139 MMOL/L — SIGNIFICANT CHANGE UP (ref 135–145)
SODIUM SERPL-SCNC: 140 MMOL/L — SIGNIFICANT CHANGE UP (ref 135–145)
WBC # BLD: 12.97 K/UL — HIGH (ref 3.8–10.5)
WBC # BLD: 7.42 K/UL — SIGNIFICANT CHANGE UP (ref 3.8–10.5)
WBC # BLD: 8.21 K/UL — SIGNIFICANT CHANGE UP (ref 3.8–10.5)
WBC # BLD: 8.5 K/UL — SIGNIFICANT CHANGE UP (ref 3.8–10.5)
WBC # BLD: 9.43 K/UL — SIGNIFICANT CHANGE UP (ref 3.8–10.5)
WBC # FLD AUTO: 12.97 K/UL — HIGH (ref 3.8–10.5)
WBC # FLD AUTO: 7.42 K/UL — SIGNIFICANT CHANGE UP (ref 3.8–10.5)
WBC # FLD AUTO: 8.21 K/UL — SIGNIFICANT CHANGE UP (ref 3.8–10.5)
WBC # FLD AUTO: 8.5 K/UL — SIGNIFICANT CHANGE UP (ref 3.8–10.5)
WBC # FLD AUTO: 9.43 K/UL — SIGNIFICANT CHANGE UP (ref 3.8–10.5)

## 2020-08-18 PROCEDURE — 74018 RADEX ABDOMEN 1 VIEW: CPT | Mod: 26

## 2020-08-18 PROCEDURE — 99223 1ST HOSP IP/OBS HIGH 75: CPT | Mod: GC

## 2020-08-18 RX ORDER — SODIUM CHLORIDE 9 MG/ML
1000 INJECTION, SOLUTION INTRAVENOUS
Refills: 0 | Status: DISCONTINUED | OUTPATIENT
Start: 2020-08-18 | End: 2020-08-19

## 2020-08-18 RX ORDER — HYDROMORPHONE HYDROCHLORIDE 2 MG/ML
0.5 INJECTION INTRAMUSCULAR; INTRAVENOUS; SUBCUTANEOUS EVERY 6 HOURS
Refills: 0 | Status: DISCONTINUED | OUTPATIENT
Start: 2020-08-18 | End: 2020-08-19

## 2020-08-18 RX ORDER — NOREPINEPHRINE BITARTRATE/D5W 8 MG/250ML
0.05 PLASTIC BAG, INJECTION (ML) INTRAVENOUS
Qty: 8 | Refills: 0 | Status: DISCONTINUED | OUTPATIENT
Start: 2020-08-18 | End: 2020-08-18

## 2020-08-18 RX ORDER — GLUCAGON INJECTION, SOLUTION 0.5 MG/.1ML
1 INJECTION, SOLUTION SUBCUTANEOUS ONCE
Refills: 0 | Status: DISCONTINUED | OUTPATIENT
Start: 2020-08-18 | End: 2020-08-19

## 2020-08-18 RX ORDER — HYDROMORPHONE HYDROCHLORIDE 2 MG/ML
0.25 INJECTION INTRAMUSCULAR; INTRAVENOUS; SUBCUTANEOUS ONCE
Refills: 0 | Status: DISCONTINUED | OUTPATIENT
Start: 2020-08-18 | End: 2020-08-18

## 2020-08-18 RX ORDER — DEXTROSE 50 % IN WATER 50 %
25 SYRINGE (ML) INTRAVENOUS ONCE
Refills: 0 | Status: DISCONTINUED | OUTPATIENT
Start: 2020-08-18 | End: 2020-08-19

## 2020-08-18 RX ORDER — HYDROMORPHONE HYDROCHLORIDE 2 MG/ML
0.5 INJECTION INTRAMUSCULAR; INTRAVENOUS; SUBCUTANEOUS ONCE
Refills: 0 | Status: DISCONTINUED | OUTPATIENT
Start: 2020-08-18 | End: 2020-08-18

## 2020-08-18 RX ORDER — DEXTROSE 50 % IN WATER 50 %
15 SYRINGE (ML) INTRAVENOUS ONCE
Refills: 0 | Status: DISCONTINUED | OUTPATIENT
Start: 2020-08-18 | End: 2020-08-19

## 2020-08-18 RX ORDER — DEXTROSE 50 % IN WATER 50 %
12.5 SYRINGE (ML) INTRAVENOUS ONCE
Refills: 0 | Status: DISCONTINUED | OUTPATIENT
Start: 2020-08-18 | End: 2020-08-19

## 2020-08-18 RX ORDER — SODIUM CHLORIDE 9 MG/ML
500 INJECTION, SOLUTION INTRAVENOUS ONCE
Refills: 0 | Status: COMPLETED | OUTPATIENT
Start: 2020-08-18 | End: 2020-08-18

## 2020-08-18 RX ORDER — MAGNESIUM SULFATE 500 MG/ML
2 VIAL (ML) INJECTION ONCE
Refills: 0 | Status: COMPLETED | OUTPATIENT
Start: 2020-08-18 | End: 2020-08-18

## 2020-08-18 RX ORDER — INSULIN LISPRO 100/ML
VIAL (ML) SUBCUTANEOUS
Refills: 0 | Status: DISCONTINUED | OUTPATIENT
Start: 2020-08-18 | End: 2020-08-19

## 2020-08-18 RX ADMIN — HYDROMORPHONE HYDROCHLORIDE 0.5 MILLIGRAM(S): 2 INJECTION INTRAMUSCULAR; INTRAVENOUS; SUBCUTANEOUS at 20:51

## 2020-08-18 RX ADMIN — Medication 1 TABLET(S): at 21:22

## 2020-08-18 RX ADMIN — SODIUM CHLORIDE 110 MILLILITER(S): 9 INJECTION, SOLUTION INTRAVENOUS at 08:54

## 2020-08-18 RX ADMIN — Medication 7.04 MICROGRAM(S)/KG/MIN: at 06:52

## 2020-08-18 RX ADMIN — HYDROMORPHONE HYDROCHLORIDE 0.5 MILLIGRAM(S): 2 INJECTION INTRAMUSCULAR; INTRAVENOUS; SUBCUTANEOUS at 12:13

## 2020-08-18 RX ADMIN — ATORVASTATIN CALCIUM 80 MILLIGRAM(S): 80 TABLET, FILM COATED ORAL at 13:21

## 2020-08-18 RX ADMIN — HYDROMORPHONE HYDROCHLORIDE 0.25 MILLIGRAM(S): 2 INJECTION INTRAMUSCULAR; INTRAVENOUS; SUBCUTANEOUS at 11:43

## 2020-08-18 RX ADMIN — SODIUM CHLORIDE 110 MILLILITER(S): 9 INJECTION, SOLUTION INTRAVENOUS at 04:05

## 2020-08-18 RX ADMIN — HYDROMORPHONE HYDROCHLORIDE 0.5 MILLIGRAM(S): 2 INJECTION INTRAMUSCULAR; INTRAVENOUS; SUBCUTANEOUS at 16:10

## 2020-08-18 RX ADMIN — Medication 1 TABLET(S): at 20:47

## 2020-08-18 RX ADMIN — SODIUM CHLORIDE 1000 MILLILITER(S): 9 INJECTION, SOLUTION INTRAVENOUS at 05:15

## 2020-08-18 RX ADMIN — HYDROMORPHONE HYDROCHLORIDE 0.5 MILLIGRAM(S): 2 INJECTION INTRAMUSCULAR; INTRAVENOUS; SUBCUTANEOUS at 16:23

## 2020-08-18 RX ADMIN — HYDROMORPHONE HYDROCHLORIDE 0.25 MILLIGRAM(S): 2 INJECTION INTRAMUSCULAR; INTRAVENOUS; SUBCUTANEOUS at 12:00

## 2020-08-18 RX ADMIN — SODIUM CHLORIDE 500 MILLILITER(S): 9 INJECTION, SOLUTION INTRAVENOUS at 02:10

## 2020-08-18 RX ADMIN — HYDROMORPHONE HYDROCHLORIDE 0.5 MILLIGRAM(S): 2 INJECTION INTRAMUSCULAR; INTRAVENOUS; SUBCUTANEOUS at 20:01

## 2020-08-18 RX ADMIN — Medication 1 TABLET(S): at 08:20

## 2020-08-18 RX ADMIN — Medication 1000 MILLIGRAM(S): at 04:00

## 2020-08-18 RX ADMIN — Medication 50 GRAM(S): at 02:26

## 2020-08-18 RX ADMIN — Medication 400 MILLIGRAM(S): at 03:23

## 2020-08-18 RX ADMIN — Medication 1 TABLET(S): at 10:00

## 2020-08-18 RX ADMIN — Medication 1 TABLET(S): at 09:20

## 2020-08-18 RX ADMIN — HYDROMORPHONE HYDROCHLORIDE 0.5 MILLIGRAM(S): 2 INJECTION INTRAMUSCULAR; INTRAVENOUS; SUBCUTANEOUS at 12:41

## 2020-08-18 NOTE — BRIEF OPERATIVE NOTE - OPERATION/FINDINGS
evacuation of pre peritoneal abdominal wall hematoma (approximately 100cc)   peritoneum opened and abdomen irrigated, small clot noted, peritoneum then closed with vicryl suture   anterior fascia closed over pre peritoneal space with vicryl, skin closed with monocryl   diagnostic laparoscopy performed and abdomen accessed with veress needle only small clot appreciated

## 2020-08-18 NOTE — PROGRESS NOTE ADULT - ASSESSMENT
85M with pmhx of CKD (Cr 1.59 pre-op), hx of CVA on eliquis (held starting 8/15), CAD, AFib, HTN, iliac artery anuerysm, POD1 from open inguinal hernia (R) through midline incision, umbilical hernia repair, and appendectomy, complicated by bleed with RTOR. In the SICU for hemodynamic monitoring. Pt on pressor support beginning this AM. Abdominal exam significant for sanguinous output in SILVIA, will continue to monitor Hgb, recommend holding eliquis in setting of bleed.    Recs:  - hold eliquis in setting of bleed  - continue to trend Hgb  - Surgery will continue to follow    - primary management through SICU team

## 2020-08-18 NOTE — PROVIDER CONTACT NOTE (CRITICAL VALUE NOTIFICATION) - ACTION/TREATMENT ORDERED:
2 units of PRBC ordered. 1 unit was initially given running for 1 hr. Report was already given to ADONAY Irizarry in OR. Pt was then transported to OR with 3 Surgical MDs, 1 unit PRBC transfusing and Zoll monitor. 2 units of PRBC ordered. 1 unit was initially given running for 1 hr. Activity order changed to bedrest. Report was already given to ADONAY Irizarry in OR. Pt was then transported to OR with 3 Surgical MDs, 1 unit PRBC transfusing and Zoll monitor.

## 2020-08-18 NOTE — PROGRESS NOTE ADULT - SUBJECTIVE AND OBJECTIVE BOX
INTERVAL HPI/OVERNIGHT EVENTS:         MEDICATIONS  (STANDING):  atorvastatin 80 milliGRAM(s) Oral daily  dextrose 5%. 1000 milliLiter(s) (50 mL/Hr) IV Continuous <Continuous>  dextrose 50% Injectable 12.5 Gram(s) IV Push once  dextrose 50% Injectable 25 Gram(s) IV Push once  dextrose 50% Injectable 25 Gram(s) IV Push once  insulin lispro (HumaLOG) corrective regimen sliding scale   SubCutaneous Before meals and at bedtime  lactated ringers. 1000 milliLiter(s) (110 mL/Hr) IV Continuous <Continuous>    MEDICATIONS  (PRN):  acetaminophen 300 mG/codeine 30 mG 1 Tablet(s) Oral every 4 hours PRN Severe Pain (7 - 10)  dextrose 40% Gel 15 Gram(s) Oral once PRN Blood Glucose LESS THAN 70 milliGRAM(s)/deciliter  glucagon  Injectable 1 milliGRAM(s) IntraMuscular once PRN Glucose LESS THAN 70 milligrams/deciliter  ondansetron Injectable 4 milliGRAM(s) IV Push every 8 hours PRN Nausea      Drug Dosing Weight  Height (cm): 180.34 (17 Aug 2020 22:52)  Weight (kg): 75.1 (17 Aug 2020 22:52)  BMI (kg/m2): 23.1 (17 Aug 2020 22:52)  BSA (m2): 1.95 (17 Aug 2020 22:52)    PAST MEDICAL & SURGICAL HISTORY:  Atrial fibrillation  Confused: at night  Aneurysm of abdominal vessel  Polycystic kidney  Hyperparathyroidism  CVA (cerebral vascular accident): 2019  H/O prostate cancer  CKD (chronic kidney disease), stage III  HLD (hyperlipidemia)  HTN (hypertension)  CAD (coronary artery disease)  Atherosclerosis of aorto-iliac bypass graft: stent  S/P AAA repair  AICD (automatic cardioverter/defibrillator) present: [employer]  H/O hernia repair      ICU Vital Signs Last 24 Hrs  T(C): 36.4 (18 Aug 2020 09:36), Max: 36.7 (17 Aug 2020 19:44)  T(F): 97.6 (18 Aug 2020 09:36), Max: 98 (17 Aug 2020 19:44)  HR: 61 (18 Aug 2020 14:00) (44 - 78)  BP: 112/57 (18 Aug 2020 14:00) (58/40 - 162/87)  BP(mean): 76 (18 Aug 2020 14:00) (65 - 118)  ABP: 99/93 (18 Aug 2020 13:00) (82/41 - 270/265)  ABP(mean): 95 (18 Aug 2020 13:00) (57 - 266)  RR: 11 (18 Aug 2020 14:00) (11 - 31)  SpO2: 95% (18 Aug 2020 14:00) (95% - 100%)      ABG - ( 18 Aug 2020 03:17 )  pH, Arterial: 7.36  pH, Blood: x     /  pCO2: 33    /  pO2: 95    / HCO3: 18    / Base Excess: -6.4  /  SaO2: 97                    17 Aug 2020 07:01  -  18 Aug 2020 07:00  --------------------------------------------------------  IN:    IV PiggyBack: 150 mL    Lactated Ringers IV Bolus: 1000 mL    lactated ringers.: 440 mL    lactated ringers.: 280 mL    norepinephrine Infusion: 6 mL    Sodium Chloride 0.9% IV Bolus: 250 mL  Total IN: 2126 mL    OUT:    Drain: 160 mL    Indwelling Catheter - Urethral: 485 mL    Intermittent Catheterization - Urethral: 800 mL  Total OUT: 1445 mL    Total NET: 681 mL      18 Aug 2020 07:01  -  18 Aug 2020 14:19  --------------------------------------------------------  IN:    lactated ringers.: 880 mL    norepinephrine Infusion: 8.8 mL  Total IN: 888.8 mL    OUT:    Drain: 50 mL    Indwelling Catheter - Urethral: 515 mL  Total OUT: 565 mL    Total NET: 323.8 mL              PHYSICAL EXAM:      Constitutional:    Eyes:    ENMT:    Neck:    Breasts:    Back:    Respiratory:    Cardiovascular:    Gastrointestinal:    Genitourinary:    Rectal:    Extremities:    Vascular:    Neurological:    Skin:    Lymph Nodes:    Musculoskeletal:    Psychiatric:        LABS:  CBC Full  -  ( 18 Aug 2020 12:11 )  WBC Count : 8.50 K/uL  RBC Count : 3.32 M/uL  Hemoglobin : 9.8 g/dL  Hematocrit : 28.8 %  Platelet Count - Automated : 143 K/uL  Mean Cell Volume : 86.7 fl  Mean Cell Hemoglobin : 29.5 pg  Mean Cell Hemoglobin Concentration : 34.0 gm/dL        08-18    139  |  108  |  50<H>  ----------------------------<  166<H>  4.4   |  18<L>  |  1.77<H>    Ca    9.1      18 Aug 2020 03:11  Phos  4.6     08-18  Mg     2.0     08-18      PT/INR - ( 17 Aug 2020 23:55 )   PT: 13.7 sec;   INR: 1.15          PTT - ( 17 Aug 2020 23:55 )  PTT:35.2 sec      RADIOLOGY & ADDITIONAL STUDIES: INTERVAL HPI/OVERNIGHT EVENTS: Patient reports feeling well, denies nausea, vomit, chest pain, calves tenderness, SOB, scrotal pain, lower abdominal pain, dizziness, palpitation or inguinal area pain.       MEDICATIONS  (STANDING):  atorvastatin 80 milliGRAM(s) Oral daily  dextrose 5%. 1000 milliLiter(s) (50 mL/Hr) IV Continuous <Continuous>  dextrose 50% Injectable 12.5 Gram(s) IV Push once  dextrose 50% Injectable 25 Gram(s) IV Push once  dextrose 50% Injectable 25 Gram(s) IV Push once  insulin lispro (HumaLOG) corrective regimen sliding scale   SubCutaneous Before meals and at bedtime  lactated ringers. 1000 milliLiter(s) (110 mL/Hr) IV Continuous <Continuous>    MEDICATIONS  (PRN):  acetaminophen 300 mG/codeine 30 mG 1 Tablet(s) Oral every 4 hours PRN Severe Pain (7 - 10)  dextrose 40% Gel 15 Gram(s) Oral once PRN Blood Glucose LESS THAN 70 milliGRAM(s)/deciliter  glucagon  Injectable 1 milliGRAM(s) IntraMuscular once PRN Glucose LESS THAN 70 milligrams/deciliter  ondansetron Injectable 4 milliGRAM(s) IV Push every 8 hours PRN Nausea      Drug Dosing Weight  Height (cm): 180.34 (17 Aug 2020 22:52)  Weight (kg): 75.1 (17 Aug 2020 22:52)  BMI (kg/m2): 23.1 (17 Aug 2020 22:52)  BSA (m2): 1.95 (17 Aug 2020 22:52)    PAST MEDICAL & SURGICAL HISTORY:  Atrial fibrillation  Confused: at night  Aneurysm of abdominal vessel  Polycystic kidney  Hyperparathyroidism  CVA (cerebral vascular accident): 2019  H/O prostate cancer  CKD (chronic kidney disease), stage III  HLD (hyperlipidemia)  HTN (hypertension)  CAD (coronary artery disease)  Atherosclerosis of aorto-iliac bypass graft: stent  S/P AAA repair  AICD (automatic cardioverter/defibrillator) present: [employer]  H/O hernia repair      ICU Vital Signs Last 24 Hrs  T(C): 36.4 (18 Aug 2020 09:36), Max: 36.7 (17 Aug 2020 19:44)  T(F): 97.6 (18 Aug 2020 09:36), Max: 98 (17 Aug 2020 19:44)  HR: 61 (18 Aug 2020 14:00) (44 - 78)  BP: 112/57 (18 Aug 2020 14:00) (58/40 - 162/87)  BP(mean): 76 (18 Aug 2020 14:00) (65 - 118)  ABP: 99/93 (18 Aug 2020 13:00) (82/41 - 270/265)  ABP(mean): 95 (18 Aug 2020 13:00) (57 - 266)  RR: 11 (18 Aug 2020 14:00) (11 - 31)  SpO2: 95% (18 Aug 2020 14:00) (95% - 100%)      ABG - ( 18 Aug 2020 03:17 )  pH, Arterial: 7.36  pH, Blood: x     /  pCO2: 33    /  pO2: 95    / HCO3: 18    / Base Excess: -6.4  /  SaO2: 97            17 Aug 2020 07:01  -  18 Aug 2020 07:00  --------------------------------------------------------  IN:    IV PiggyBack: 150 mL    Lactated Ringers IV Bolus: 1000 mL    lactated ringers.: 440 mL    lactated ringers.: 280 mL    norepinephrine Infusion: 6 mL    Sodium Chloride 0.9% IV Bolus: 250 mL  Total IN: 2126 mL    OUT:    Drain: 160 mL    Indwelling Catheter - Urethral: 485 mL    Intermittent Catheterization - Urethral: 800 mL  Total OUT: 1445 mL    Total NET: 681 mL      18 Aug 2020 07:01  -  18 Aug 2020 14:19  --------------------------------------------------------  IN:    lactated ringers.: 880 mL    norepinephrine Infusion: 8.8 mL  Total IN: 888.8 mL    OUT:    Drain: 50 mL    Indwelling Catheter - Urethral: 515 mL  Total OUT: 565 mL    Total NET: 323.8 mL      PHYSICAL EXAM:    General: NAD, resting comfortably in bed.   NEURO: AAOx3, follows commands  HEENT: MMM   CV: RRR, no MRG, arterial BP: 99/45, HR: 64,   PULM: CTAB, nonlabored breathing, no respiratory distress, RR: 11  ABD: soft, NT/ND. No rebound, no guarding, no tympany. Incisions clean, intact, dry, without hematoma.  : Goode in place, no scrotal hematoma, mild edema,   EXTREM: WWP, no edema, no calf tenderness  VASC: No cyanosis, pallor. Palpable radial and PT bilaterally  SKIN: No rashes noted  PSYCH: Appropriate affect      LABS:  CBC Full  -  ( 18 Aug 2020 12:11 )  WBC Count : 8.50 K/uL  RBC Count : 3.32 M/uL  Hemoglobin : 9.8 g/dL  Hematocrit : 28.8 %  Platelet Count - Automated : 143 K/uL  Mean Cell Volume : 86.7 fl  Mean Cell Hemoglobin : 29.5 pg  Mean Cell Hemoglobin Concentration : 34.0 gm/dL        08-18    139  |  108  |  50<H>  ----------------------------<  166<H>  4.4   |  18<L>  |  1.77<H>    Ca    9.1      18 Aug 2020 03:11  Phos  4.6     08-18  Mg     2.0     08-18      PT/INR - ( 17 Aug 2020 23:55 )   PT: 13.7 sec;   INR: 1.15          PTT - ( 17 Aug 2020 23:55 )  PTT:35.2 sec      RADIOLOGY & ADDITIONAL STUDIES: INTERVAL HPI/OVERNIGHT EVENTS: Patient reports feeling well, denies nausea, vomit, chest pain, calf tenderness, SOB, scrotal pain, lower abdominal pain, dizziness, palpitation or inguinal area pain.       MEDICATIONS  (STANDING):  atorvastatin 80 milliGRAM(s) Oral daily  dextrose 5%. 1000 milliLiter(s) (50 mL/Hr) IV Continuous <Continuous>  dextrose 50% Injectable 12.5 Gram(s) IV Push once  dextrose 50% Injectable 25 Gram(s) IV Push once  dextrose 50% Injectable 25 Gram(s) IV Push once  insulin lispro (HumaLOG) corrective regimen sliding scale   SubCutaneous Before meals and at bedtime  lactated ringers. 1000 milliLiter(s) (110 mL/Hr) IV Continuous <Continuous>    MEDICATIONS  (PRN):  acetaminophen 300 mG/codeine 30 mG 1 Tablet(s) Oral every 4 hours PRN Severe Pain (7 - 10)  dextrose 40% Gel 15 Gram(s) Oral once PRN Blood Glucose LESS THAN 70 milliGRAM(s)/deciliter  glucagon  Injectable 1 milliGRAM(s) IntraMuscular once PRN Glucose LESS THAN 70 milligrams/deciliter  ondansetron Injectable 4 milliGRAM(s) IV Push every 8 hours PRN Nausea      Drug Dosing Weight  Height (cm): 180.34 (17 Aug 2020 22:52)  Weight (kg): 75.1 (17 Aug 2020 22:52)  BMI (kg/m2): 23.1 (17 Aug 2020 22:52)  BSA (m2): 1.95 (17 Aug 2020 22:52)    PAST MEDICAL & SURGICAL HISTORY:  Atrial fibrillation  Confused: at night  Aneurysm of abdominal vessel  Polycystic kidney  Hyperparathyroidism  CVA (cerebral vascular accident): 2019  H/O prostate cancer  CKD (chronic kidney disease), stage III  HLD (hyperlipidemia)  HTN (hypertension)  CAD (coronary artery disease)  Atherosclerosis of aorto-iliac bypass graft: stent  S/P AAA repair  AICD (automatic cardioverter/defibrillator) present: [employer]  H/O hernia repair      ICU Vital Signs Last 24 Hrs  T(C): 36.4 (18 Aug 2020 09:36), Max: 36.7 (17 Aug 2020 19:44)  T(F): 97.6 (18 Aug 2020 09:36), Max: 98 (17 Aug 2020 19:44)  HR: 61 (18 Aug 2020 14:00) (44 - 78)  BP: 112/57 (18 Aug 2020 14:00) (58/40 - 162/87)  BP(mean): 76 (18 Aug 2020 14:00) (65 - 118)  ABP: 99/93 (18 Aug 2020 13:00) (82/41 - 270/265)  ABP(mean): 95 (18 Aug 2020 13:00) (57 - 266)  RR: 11 (18 Aug 2020 14:00) (11 - 31)  SpO2: 95% (18 Aug 2020 14:00) (95% - 100%)      ABG - ( 18 Aug 2020 03:17 )  pH, Arterial: 7.36  pH, Blood: x     /  pCO2: 33    /  pO2: 95    / HCO3: 18    / Base Excess: -6.4  /  SaO2: 97            17 Aug 2020 07:01  -  18 Aug 2020 07:00  --------------------------------------------------------  IN:    IV PiggyBack: 150 mL    Lactated Ringers IV Bolus: 1000 mL    lactated ringers.: 440 mL    lactated ringers.: 280 mL    norepinephrine Infusion: 6 mL    Sodium Chloride 0.9% IV Bolus: 250 mL  Total IN: 2126 mL    OUT:    Drain: 160 mL    Indwelling Catheter - Urethral: 485 mL    Intermittent Catheterization - Urethral: 800 mL  Total OUT: 1445 mL    Total NET: 681 mL      18 Aug 2020 07:01  -  18 Aug 2020 14:19  --------------------------------------------------------  IN:    lactated ringers.: 880 mL    norepinephrine Infusion: 8.8 mL  Total IN: 888.8 mL    OUT:    Drain: 50 mL    Indwelling Catheter - Urethral: 515 mL  Total OUT: 565 mL    Total NET: 323.8 mL      PHYSICAL EXAM:    General: NAD, resting comfortably in bed.   NEURO: AAOx3, follows commands. Had some transient confusion earlier this AM, now resolved.  HEENT: MMM   CV: RRR, no MRG, arterial BP: 99/45, HR: 64,   PULM: CTAB, nonlabored breathing, no respiratory distress, RR: 11  ABD: soft, NT/ND. No rebound, no guarding, no tympany. Incisions clean, intact, dry, without hematoma.  : Goode in place, no scrotal hematoma, mild edema,   EXTREM: WWP, no edema, no calf tenderness  VASC: No cyanosis, pallor. Palpable radial and PT bilaterally  SKIN: No rashes noted  PSYCH: Appropriate affect      LABS:  CBC Full  -  ( 18 Aug 2020 12:11 )  WBC Count : 8.50 K/uL  RBC Count : 3.32 M/uL  Hemoglobin : 9.8 g/dL  Hematocrit : 28.8 %  Platelet Count - Automated : 143 K/uL  Mean Cell Volume : 86.7 fl  Mean Cell Hemoglobin : 29.5 pg  Mean Cell Hemoglobin Concentration : 34.0 gm/dL        08-18    139  |  108  |  50<H>  ----------------------------<  166<H>  4.4   |  18<L>  |  1.77<H>    Ca    9.1      18 Aug 2020 03:11  Phos  4.6     08-18  Mg     2.0     08-18      PT/INR - ( 17 Aug 2020 23:55 )   PT: 13.7 sec;   INR: 1.15          PTT - ( 17 Aug 2020 23:55 )  PTT:35.2 sec      RADIOLOGY & ADDITIONAL STUDIES:

## 2020-08-18 NOTE — PATIENT PROFILE ADULT - NSPROEDALEARNPREF_GEN_A_NUR
verbal instruction/written material/individual instruction verbal instruction/individual instruction

## 2020-08-18 NOTE — BRIEF OPERATIVE NOTE - NSICDXBRIEFPROCEDURE_GEN_ALL_CORE_FT
PROCEDURES:  Repair, hernia, umbilical, adult 17-Aug-2020 14:15:35  Soy Glass  Appendectomy, open 17-Aug-2020 14:15:25  Soy Glass  Repair, inguinal hernia, sliding 17-Aug-2020 14:15:09  Soy Glass
PROCEDURES:  Evacuation of hematoma of abdomen 18-Aug-2020 00:41:51  Rikki Chaudhry

## 2020-08-18 NOTE — BRIEF OPERATIVE NOTE - NSICDXBRIEFPREOP_GEN_ALL_CORE_FT
PRE-OP DIAGNOSIS:  Umbilical hernia 17-Aug-2020 14:16:52  Soy Glass  Right inguinal hernia 17-Aug-2020 14:16:43  Soy Glass
PRE-OP DIAGNOSIS:  Abdominal wall hematoma 18-Aug-2020 00:42:04  Rikki Chaudhry

## 2020-08-18 NOTE — PROGRESS NOTE ADULT - SUBJECTIVE AND OBJECTIVE BOX
STATUS POST:  POD1 from open inguinal hernia (R) through midline incision, umbilical hernia repair, and appendectomy, complicated by bleed with RTOR     SUBJECTIVE: Pt was noted to be hypotensive w/ BP's in 50's/40s, stat Hgb of 4.5. Was taken to the OR for evacuation of pre peritoneal abdominal wall hematoma (approximately 100cc). Pt is s/p 2U of PRBCs and 1U of FFP in OR, with Hgb response to 9.6.     This AM, patient seen and examined at bedside by chief resident. He was started on pressor support this morning. He reports abdominal pain in right lower abdomen.    norepinephrine Infusion 0.05 MICROgram(s)/kG/Min IV Continuous <Continuous>      Vital Signs Last 24 Hrs  T(C): 36.4 (18 Aug 2020 07:00), Max: 36.7 (17 Aug 2020 19:44)  T(F): 97.5 (18 Aug 2020 07:00), Max: 98 (17 Aug 2020 19:44)  HR: 63 (18 Aug 2020 08:00) (44 - 78)  BP: 94/54 (18 Aug 2020 08:00) (58/40 - 162/87)  BP(mean): 70 (18 Aug 2020 08:00) (65 - 118)  RR: 18 (18 Aug 2020 08:00) (11 - 31)  SpO2: 99% (18 Aug 2020 08:00) (95% - 100%)  I&O's Detail    17 Aug 2020 07:01  -  18 Aug 2020 07:00  --------------------------------------------------------  IN:    IV PiggyBack: 150 mL    Lactated Ringers IV Bolus: 1000 mL    lactated ringers.: 440 mL    lactated ringers.: 280 mL    norepinephrine Infusion: 6 mL    Sodium Chloride 0.9% IV Bolus: 250 mL  Total IN: 2126 mL    OUT:    Drain: 160 mL    Indwelling Catheter - Urethral: 485 mL    Intermittent Catheterization - Urethral: 800 mL  Total OUT: 1445 mL    Total NET: 681 mL      18 Aug 2020 07:01  -  18 Aug 2020 09:02  --------------------------------------------------------  IN:    lactated ringers.: 110 mL    norepinephrine Infusion: 6 mL  Total IN: 116 mL    OUT:    Indwelling Catheter - Urethral: 90 mL  Total OUT: 90 mL    Total NET: 26 mL          Physical Exam:  General: No acute distress, appears comfortable  Neuro: conversational  C/V: normal sinus rhythm  Pulm: Nonlabored breathing, no respiratory distress  Abd: soft, TTP in RLQ, non-distended, incisions clean/dry/intact, no ecchymosis present, SILVIA in RLQ with sanguinous output.  Extrem: SCDs in place    LABS:                        9.3    9.43  )-----------( 123      ( 18 Aug 2020 04:37 )             27.5     08-18    139  |  108  |  50<H>  ----------------------------<  166<H>  4.4   |  18<L>  |  1.77<H>    Ca    9.1      18 Aug 2020 03:11  Phos  4.6     08-18  Mg     2.0     08-18      PT/INR - ( 17 Aug 2020 23:55 )   PT: 13.7 sec;   INR: 1.15          PTT - ( 17 Aug 2020 23:55 )  PTT:35.2 sec      RADIOLOGY & ADDITIONAL STUDIES:

## 2020-08-18 NOTE — PROGRESS NOTE ADULT - ASSESSMENT
85M with pmhx of CKD (Cr 1.59 pre-op), hx of CVA on eliquis (held starting 8/15), CAD, AFib, HTN, iliac artery anuerysm, now s/p elective procedure for repair of large right inguinal hernia on 8/17. On the floors, patient found to be hypotensive with a Hgb of 4.5. Taken urgently to the OR for ex lap and hematoma evacuation. To SICU for hemodynamic monitoring.      Neuro: tylenol PRN  CV: hemorrhagic shock 2/2 abd wall hematoma, resolved; MAP >65  Pulm: NC PRN   GI: NPO, LR @110  : tapia   ID: none  Endo: ISS  ppx: SCD, holding chemo ppx in setting of bleed    Lines: BENNY Garrett(8/17-), SILVIA x 1  PT/OT: not ordered 85M with pmhx of CKD (Cr 1.59 pre-op), hx of CVA on eliquis (held starting 8/15), CAD, AFib, HTN, iliac artery anuerysm, now s/p elective procedure for repair of large right inguinal hernia on 8/17. On the floors, patient found to be hypotensive with a Hgb of 4.5 with acute blood loss anemia Taken urgently to the OR for ex lap and hematoma evacuation. To SICU for hemodynamic monitoring.  Now with some postischemic ATN    Neuro: tylenol PRN  CV: hemorrhagic shock 2/2 abd wall hematoma, resolved; MAP >65  HEME: H/H now stable; had outsize response to the 2 units PRBC. Holding apixaban for now.  Pulm: NC PRN   GI: NPO, LR @110  : tapia; follow BUN/creatinine but UO excellent and the numbers should improve   ID: no issues  Endo: ISS  ppx: SCD, holding chemo ppx in setting of bleed    Lines: BENNY Garrett(8/17-), SILVIA x 1  PT/OT: not ordered

## 2020-08-18 NOTE — CONSULT NOTE ADULT - SUBJECTIVE AND OBJECTIVE BOX
Pt is an 84 y/o M with pmhx of CKD, hx of CVA on eliquis at home which was held starting 8/15, CAD, AFib, HTN, iliac artery anuerysm, presented on 8/17 for elective open right inguinal hernia repair. Roughly 5 hours post-op, patient became hypotensive to SBP 60s on multple cuff reads. Patient appeared somnolent and pale. Abdominal exam showed asymmetric swelling in the R abdomnial wall, soft to touch. STAT CBC showed a Hgb of 4.5. 2u pRBC ordered and started immediately. Chief resident assessed patient at bedside who then called Dr. Gomez and the patient was taken urgently to the OR for an ex lap and hematoma evacuation. Transferred to SICU post op for close hemodynamic monitoring.     Meds: eliquis, lisinopril, ezetemibe    8/17: open inguinal hernia (R) through midline incision, umbilical hernia repair, and appy  8/17: RTOR ex lap and evacuation of abdominal wall hematoma, EBL 100cc, received 2u PRBC and 1u FFP in OR     ICU Vital Signs Last 24 Hrs  T(F): 96.6 (08-18-20 @ 01:30), Max: 98 (08-17-20 @ 19:44)  HR: 78 (08-18-20 @ 02:00) (44 - 78)  BP: 58/40 (08-17-20 @ 22:52) (58/40 - 162/87)  BP(mean): 97 (08-17-20 @ 15:38) (94 - 118)  ABP: 91/45 (08-18-20 @ 02:00)  RR: 13 (08-18-20 @ 02:00) (12 - 31)  SpO2: 95% (08-18-20 @ 02:00) (95% - 100%)    PHYSICAL EXAM:   Neurological: AAOx3, strength 5/5 b/l, no focal deficits  Cardiovascular: regular rate and rhythm, no murmurs   Respiratory: no respiratory distress, CTAB  Gastrointestinal: soft, NT, minimally distended, incision CDI, SILVIA w/ ss output  Extremities: warm, no dependent edema  Vascular: no cyanosis/erythema    LABS:    08-17    136  |  105  |  34<H>  ----------------------------<  101<H>  4.5   |  11<L>  |  1.17    Ca    7.8<L>      17 Aug 2020 21:27  Phos  2.4     08-17  Mg     1.1     08-17                          9.6    12.97 )-----------( 141      ( 17 Aug 2020 23:55 )             29.7   PT/INR - ( 17 Aug 2020 23:55 )   PT: 13.7 sec;   INR: 1.15          PTT - ( 17 Aug 2020 23:55 )  PTT:35.2 secCARDIAC MARKERS ( 17 Aug 2020 21:27 )  x     / <0.01 ng/mL / 22 U/L / x     / <1.0 ng/mL    ABG - ( 17 Aug 2020 23:46 )  pH, Arterial: 7.27  pH, Blood: x     /  pCO2: 41    /  pO2: 369   / HCO3: 19    / Base Excess: -7.7  /  SaO2: x               CAPILLARY BLOOD GLUCOSE      POCT Blood Glucose.: 105 mg/dL (17 Aug 2020 13:57)

## 2020-08-18 NOTE — CONSULT NOTE ADULT - ASSESSMENT
85M with pmhx of CKD (Cr 1.59 pre-op), hx of CVA on eliquis (held starting 8/15), CAD, AFib, HTN, iliac artery anuerysm, now s/p elective procedure for repair of large right inguinal hernia on 8/17. On the floors, patient found to be hypotensive with a Hgb of 4.5. Taken urgently to the OR for ex lap.     Neuro: tylenol PRN  CV: hemorrhagic shock 2/2 abd wall hematoma, resolved; MAP >65  Pulm: NC PRN   GI: NPO, LR @110  : tapia   ID: none  Endo: ISS  ppx: SCD, holding chemo ppx in setting of bleed    Lines: BENNY Garrett(8/17-), SILVIA x 1  PT/OT: not ordered

## 2020-08-19 LAB
ANION GAP SERPL CALC-SCNC: 10 MMOL/L — SIGNIFICANT CHANGE UP (ref 5–17)
BUN SERPL-MCNC: 39 MG/DL — HIGH (ref 7–23)
CALCIUM SERPL-MCNC: 9.3 MG/DL — SIGNIFICANT CHANGE UP (ref 8.4–10.5)
CHLORIDE SERPL-SCNC: 111 MMOL/L — HIGH (ref 96–108)
CO2 SERPL-SCNC: 19 MMOL/L — LOW (ref 22–31)
CREAT SERPL-MCNC: 1.39 MG/DL — HIGH (ref 0.5–1.3)
GLUCOSE BLDC GLUCOMTR-MCNC: 115 MG/DL — HIGH (ref 70–99)
GLUCOSE SERPL-MCNC: 105 MG/DL — HIGH (ref 70–99)
HCT VFR BLD CALC: 25.1 % — LOW (ref 39–50)
HCT VFR BLD CALC: 29.3 % — LOW (ref 39–50)
HGB BLD-MCNC: 8.2 G/DL — LOW (ref 13–17)
HGB BLD-MCNC: 9.4 G/DL — LOW (ref 13–17)
MAGNESIUM SERPL-MCNC: 1.8 MG/DL — SIGNIFICANT CHANGE UP (ref 1.6–2.6)
MCHC RBC-ENTMCNC: 29.1 PG — SIGNIFICANT CHANGE UP (ref 27–34)
MCHC RBC-ENTMCNC: 29.2 PG — SIGNIFICANT CHANGE UP (ref 27–34)
MCHC RBC-ENTMCNC: 32.1 GM/DL — SIGNIFICANT CHANGE UP (ref 32–36)
MCHC RBC-ENTMCNC: 32.7 GM/DL — SIGNIFICANT CHANGE UP (ref 32–36)
MCV RBC AUTO: 89.3 FL — SIGNIFICANT CHANGE UP (ref 80–100)
MCV RBC AUTO: 90.7 FL — SIGNIFICANT CHANGE UP (ref 80–100)
NRBC # BLD: 0 /100 WBCS — SIGNIFICANT CHANGE UP (ref 0–0)
NRBC # BLD: 0 /100 WBCS — SIGNIFICANT CHANGE UP (ref 0–0)
PHOSPHATE SERPL-MCNC: 2.3 MG/DL — LOW (ref 2.5–4.5)
PLATELET # BLD AUTO: 112 K/UL — LOW (ref 150–400)
PLATELET # BLD AUTO: 122 K/UL — LOW (ref 150–400)
POTASSIUM SERPL-MCNC: 4.4 MMOL/L — SIGNIFICANT CHANGE UP (ref 3.5–5.3)
POTASSIUM SERPL-SCNC: 4.4 MMOL/L — SIGNIFICANT CHANGE UP (ref 3.5–5.3)
RBC # BLD: 2.81 M/UL — LOW (ref 4.2–5.8)
RBC # BLD: 3.23 M/UL — LOW (ref 4.2–5.8)
RBC # FLD: 14.6 % — HIGH (ref 10.3–14.5)
RBC # FLD: 14.8 % — HIGH (ref 10.3–14.5)
SODIUM SERPL-SCNC: 140 MMOL/L — SIGNIFICANT CHANGE UP (ref 135–145)
WBC # BLD: 6.13 K/UL — SIGNIFICANT CHANGE UP (ref 3.8–10.5)
WBC # BLD: 7.07 K/UL — SIGNIFICANT CHANGE UP (ref 3.8–10.5)
WBC # FLD AUTO: 6.13 K/UL — SIGNIFICANT CHANGE UP (ref 3.8–10.5)
WBC # FLD AUTO: 7.07 K/UL — SIGNIFICANT CHANGE UP (ref 3.8–10.5)

## 2020-08-19 PROCEDURE — 99233 SBSQ HOSP IP/OBS HIGH 50: CPT | Mod: GC

## 2020-08-19 RX ORDER — HALOPERIDOL DECANOATE 100 MG/ML
0.5 INJECTION INTRAMUSCULAR ONCE
Refills: 0 | Status: COMPLETED | OUTPATIENT
Start: 2020-08-19 | End: 2020-08-19

## 2020-08-19 RX ORDER — TAMSULOSIN HYDROCHLORIDE 0.4 MG/1
0.4 CAPSULE ORAL AT BEDTIME
Refills: 0 | Status: DISCONTINUED | OUTPATIENT
Start: 2020-08-19 | End: 2020-08-29

## 2020-08-19 RX ORDER — SODIUM CHLORIDE 9 MG/ML
1000 INJECTION, SOLUTION INTRAVENOUS
Refills: 0 | Status: DISCONTINUED | OUTPATIENT
Start: 2020-08-19 | End: 2020-08-23

## 2020-08-19 RX ORDER — MAGNESIUM SULFATE 500 MG/ML
2 VIAL (ML) INJECTION ONCE
Refills: 0 | Status: COMPLETED | OUTPATIENT
Start: 2020-08-19 | End: 2020-08-19

## 2020-08-19 RX ORDER — LANOLIN ALCOHOL/MO/W.PET/CERES
5 CREAM (GRAM) TOPICAL AT BEDTIME
Refills: 0 | Status: DISCONTINUED | OUTPATIENT
Start: 2020-08-19 | End: 2020-08-29

## 2020-08-19 RX ADMIN — Medication 64.25 MILLIMOLE(S): at 08:47

## 2020-08-19 RX ADMIN — ATORVASTATIN CALCIUM 80 MILLIGRAM(S): 80 TABLET, FILM COATED ORAL at 12:48

## 2020-08-19 RX ADMIN — Medication 50 GRAM(S): at 08:47

## 2020-08-19 RX ADMIN — HALOPERIDOL DECANOATE 0.5 MILLIGRAM(S): 100 INJECTION INTRAMUSCULAR at 00:42

## 2020-08-19 RX ADMIN — Medication 1 TABLET(S): at 16:47

## 2020-08-19 RX ADMIN — Medication 1 TABLET(S): at 06:38

## 2020-08-19 RX ADMIN — HYDROMORPHONE HYDROCHLORIDE 0.5 MILLIGRAM(S): 2 INJECTION INTRAMUSCULAR; INTRAVENOUS; SUBCUTANEOUS at 01:25

## 2020-08-19 RX ADMIN — SODIUM CHLORIDE 70 MILLILITER(S): 9 INJECTION, SOLUTION INTRAVENOUS at 12:49

## 2020-08-19 RX ADMIN — HYDROMORPHONE HYDROCHLORIDE 0.5 MILLIGRAM(S): 2 INJECTION INTRAMUSCULAR; INTRAVENOUS; SUBCUTANEOUS at 00:44

## 2020-08-19 RX ADMIN — Medication 5 MILLIGRAM(S): at 21:16

## 2020-08-19 RX ADMIN — TAMSULOSIN HYDROCHLORIDE 0.4 MILLIGRAM(S): 0.4 CAPSULE ORAL at 21:16

## 2020-08-19 RX ADMIN — Medication 1 TABLET(S): at 05:48

## 2020-08-19 RX ADMIN — Medication 1 TABLET(S): at 17:30

## 2020-08-19 NOTE — PROGRESS NOTE ADULT - ASSESSMENT
85M with pmhx of CKD (Cr 1.59 pre-op), hx of CVA on eliquis (held starting 8/15), CAD, AFib, HTN, iliac artery anuerysm, now s/p elective procedure for repair of large right inguinal hernia on 8/17. On the floors, patient found to be hypotensive with a Hgb of 4.5. Taken urgently to the OR for ex lap and hematoma evacuation. To SICU for hemodynamic monitoring.      Neuro: tylenol PRN, melatonin  CV: hemorrhagic shock 2/2 abd wall hematoma, resolved; MAP >65  Pulm: NC PRN   GI: NPO except meds  : tapia Bladder scan q6 with straight cath if > 300. Flomax 0.4 qhs  ID: none  Endo: ISS  ppx: SCD, holding chemo ppx in setting of bleed    Lines: SILVIA x 1  PT/OT: not ordered   Discontinued fluids, scheduled for cbc at 12 noon. Stepped down from SICU to telemetry. 85M with pmhx of CKD (Cr 1.59 pre-op), hx of CVA on eliquis (held starting 8/15), CAD, AFib, HTN, iliac artery anuerysm, now s/p elective procedure for repair of large right inguinal hernia on 8/17. On the floors, patient found to be hypotensive with a Hgb of 4.5. Taken urgently to the OR for ex lap and hematoma evacuation. To SICU for hemodynamic monitoring.      Neuro: tylenol PRN, melatonin  CV: hemorrhagic shock 2/2 abd wall hematoma, resolved; MAP >65  Pulm: NC PRN   GI: NPO except meds  :  Bladder scan q6 with straight cath if > 300. Flomax 0.4 qhs  ID: none  Endo: ISS  Psych: trial of nighttime melatonin  ppx: SCD, holding chemo ppx in setting of bleed    Lines: SILVIA x 1  PT/OT: not ordered   Discontinued fluids, scheduled for cbc at 12 noon. Stepped down from SICU to telemetry. 85M with pmhx of CKD (Cr 1.59 pre-op), hx of CVA on eliquis (held starting 8/15), CAD, AFib, HTN, iliac artery anuerysm, now s/p elective procedure for repair of large right inguinal hernia on 8/17. On the floors, patient found to be hypotensive with a Hgb of 4.5. Taken urgently to the OR for ex lap and hematoma evacuation. To SICU for hemodynamic monitoring.      Neuro: tylenol PRN, melatonin  CV: hemorrhagic shock 2/2 abd wall hematoma, resolved; MAP >65  Pulm: NC PRN   GI: NPO except meds  :  Bladder scan q6 with straight cath if > 300. Flomax 0.4 qhs. Ischemic ATN now resolving.  ID: none  Endo: ISS  Psych: trial of nighttime melatonin  ppx: SCD, holding chemo ppx in setting of bleed    Lines: SILVIA x 1  PT/OT: not ordered   Discontinued fluids, scheduled for cbc at 12 noon. Stepped down from SICU to telemetry.

## 2020-08-19 NOTE — PROGRESS NOTE ADULT - SUBJECTIVE AND OBJECTIVE BOX
INTERVAL HPI/OVERNIGHT EVENTS: Patient examined bedside with chief resident. Patient reports abdominal pain since yesterday, on RLQ, without radiation, worse with touch and movement. Denies nausea, vomit, chest pain, SOB, calves tenderness, scrotal hematoma or pain.       MEDICATIONS  (STANDING):  atorvastatin 80 milliGRAM(s) Oral daily  lactated ringers. 1000 milliLiter(s) (70 mL/Hr) IV Continuous <Continuous>  melatonin 5 milliGRAM(s) Oral at bedtime  tamsulosin 0.4 milliGRAM(s) Oral at bedtime    MEDICATIONS  (PRN):  acetaminophen 300 mG/codeine 30 mG 1 Tablet(s) Oral every 4 hours PRN Severe Pain (7 - 10)  HYDROmorphone  Injectable 0.5 milliGRAM(s) IV Push every 6 hours PRN Moderate Pain (4 - 6)  ondansetron Injectable 4 milliGRAM(s) IV Push every 8 hours PRN Nausea      Drug Dosing Weight  Height (cm): 180.34 (17 Aug 2020 22:52)  Weight (kg): 75.1 (17 Aug 2020 22:52)  BMI (kg/m2): 23.1 (17 Aug 2020 22:52)  BSA (m2): 1.95 (17 Aug 2020 22:52)    PAST MEDICAL & SURGICAL HISTORY:  Atrial fibrillation  Confused: at night  Aneurysm of abdominal vessel  Polycystic kidney  Hyperparathyroidism  CVA (cerebral vascular accident): 2019  H/O prostate cancer  CKD (chronic kidney disease), stage III  HLD (hyperlipidemia)  HTN (hypertension)  CAD (coronary artery disease)  Atherosclerosis of aorto-iliac bypass graft: stent  S/P AAA repair  AICD (automatic cardioverter/defibrillator) present: [employer]  H/O hernia repair      ICU Vital Signs Last 24 Hrs  T(C): 36.6 (19 Aug 2020 09:34), Max: 36.8 (18 Aug 2020 17:52)  T(F): 97.8 (19 Aug 2020 09:34), Max: 98.3 (19 Aug 2020 00:00)  HR: 68 (19 Aug 2020 11:20) (54 - 107)  BP: 169/77 (19 Aug 2020 11:20) (92/51 - 169/77)  BP(mean): 110 (19 Aug 2020 11:20) (68 - 116)  ABP: 119/57 (18 Aug 2020 18:00) (99/93 - 119/57)  ABP(mean): 82 (18 Aug 2020 18:00) (73 - 95)  RR: 18 (19 Aug 2020 11:20) (10 - 33)  SpO2: 90% (19 Aug 2020 11:20) (90% - 98%)      ABG - ( 18 Aug 2020 03:17 )  pH, Arterial: 7.36  pH, Blood: x     /  pCO2: 33    /  pO2: 95    / HCO3: 18    / Base Excess: -6.4  /  SaO2: 97                    18 Aug 2020 07:01  -  19 Aug 2020 07:00  --------------------------------------------------------  IN:    lactated ringers.: 2640 mL    norepinephrine Infusion: 8.8 mL    Oral Fluid: 600 mL  Total IN: 3248.8 mL    OUT:    Drain: 300 mL    Indwelling Catheter - Urethral: 900 mL    Intermittent Catheterization - Urethral: 400 mL    Voided: 700 mL  Total OUT: 2300 mL    Total NET: 948.8 mL      PHYSICAL EXAM:    General: NAD, mild agitation during examination  NEURO: follows commands  HEENT: PERRL, MMM  CV: RRR, no MRG  PULM: CTAB, nonlabored breathing, no respiratory distress  ABD: soft, tenderness on RLQ, incisions intact, dry, clean and without  hematoma.   : Goode in place, no scrotal hematoma  EXTREM: WWP, no edema, no calf tenderness  VASC: No cyanosis, pallor. Palpable radial and PT bilaterally      LABS:  CBC Full  -  ( 19 Aug 2020 12:36 )  WBC Count : 7.07 K/uL  RBC Count : 3.23 M/uL  Hemoglobin : 9.4 g/dL  Hematocrit : 29.3 %  Platelet Count - Automated : 122 K/uL  Mean Cell Volume : 90.7 fl  Mean Cell Hemoglobin : 29.1 pg  Mean Cell Hemoglobin Concentration : 32.1 gm/dL  Auto Neutrophil # : x  Auto Lymphocyte # : x  Auto Monocyte # : x  Auto Eosinophil # : x  Auto Basophil # : x  Auto Neutrophil % : x  Auto Lymphocyte % : x  Auto Monocyte % : x  Auto Eosinophil % : x  Auto Basophil % : x    08-19    140  |  111<H>  |  39<H>  ----------------------------<  105<H>  4.4   |  19<L>  |  1.39<H>    Ca    9.3      19 Aug 2020 06:28  Phos  2.3     08-19  Mg     1.8     08-19      PT/INR - ( 17 Aug 2020 23:55 )   PT: 13.7 sec;   INR: 1.15          PTT - ( 17 Aug 2020 23:55 )  PTT:35.2 sec      RADIOLOGY & ADDITIONAL STUDIES: INTERVAL HPI/OVERNIGHT EVENTS: Patient examined bedside with chief resident. Patient reports abdominal pain since yesterday, on RLQ, without radiation, worse with touch and movement. Denies nausea, vomit, chest pain, SOB, calves tenderness, scrotal hematoma or pain.       MEDICATIONS  (STANDING):  atorvastatin 80 milliGRAM(s) Oral daily  lactated ringers. 1000 milliLiter(s) (70 mL/Hr) IV Continuous <Continuous>  melatonin 5 milliGRAM(s) Oral at bedtime  tamsulosin 0.4 milliGRAM(s) Oral at bedtime    MEDICATIONS  (PRN):  acetaminophen 300 mG/codeine 30 mG 1 Tablet(s) Oral every 4 hours PRN Severe Pain (7 - 10)  HYDROmorphone  Injectable 0.5 milliGRAM(s) IV Push every 6 hours PRN Moderate Pain (4 - 6)  ondansetron Injectable 4 milliGRAM(s) IV Push every 8 hours PRN Nausea      Drug Dosing Weight  Height (cm): 180.34 (17 Aug 2020 22:52)  Weight (kg): 75.1 (17 Aug 2020 22:52)  BMI (kg/m2): 23.1 (17 Aug 2020 22:52)  BSA (m2): 1.95 (17 Aug 2020 22:52)    PAST MEDICAL & SURGICAL HISTORY:  Atrial fibrillation  Confused: at night  Aneurysm of abdominal vessel  Polycystic kidney  Hyperparathyroidism  CVA (cerebral vascular accident): 2019  H/O prostate cancer  CKD (chronic kidney disease), stage III  HLD (hyperlipidemia)  HTN (hypertension)  CAD (coronary artery disease)  Atherosclerosis of aorto-iliac bypass graft: stent  S/P AAA repair  AICD (automatic cardioverter/defibrillator) present: [employer]  H/O hernia repair      ICU Vital Signs Last 24 Hrs  T(C): 36.6 (19 Aug 2020 09:34), Max: 36.8 (18 Aug 2020 17:52)  T(F): 97.8 (19 Aug 2020 09:34), Max: 98.3 (19 Aug 2020 00:00)  HR: 68 (19 Aug 2020 11:20) (54 - 107)  BP: 169/77 (19 Aug 2020 11:20) (92/51 - 169/77)  BP(mean): 110 (19 Aug 2020 11:20) (68 - 116)  ABP: 119/57 (18 Aug 2020 18:00) (99/93 - 119/57)  ABP(mean): 82 (18 Aug 2020 18:00) (73 - 95)  RR: 18 (19 Aug 2020 11:20) (10 - 33)  SpO2: 90% (19 Aug 2020 11:20) (90% - 98%)      ABG - ( 18 Aug 2020 03:17 )  pH, Arterial: 7.36  pH, Blood: x     /  pCO2: 33    /  pO2: 95    / HCO3: 18    / Base Excess: -6.4  /  SaO2: 97                    18 Aug 2020 07:01  -  19 Aug 2020 07:00  --------------------------------------------------------  IN:    lactated ringers.: 2640 mL    norepinephrine Infusion: 8.8 mL    Oral Fluid: 600 mL  Total IN: 3248.8 mL    OUT:    Drain: 300 mL    Indwelling Catheter - Urethral: 900 mL    Intermittent Catheterization - Urethral: 400 mL    Voided: 700 mL  Total OUT: 2300 mL    Total NET: 948.8 mL      PHYSICAL EXAM:    General: NAD, mild agitation during examination  NEURO: follows commands  HEENT: PERRL, MMM  CV: RRR, no MRG  PULM: CTAB, nonlabored breathing, no respiratory distress  ABD: soft, tenderness on RLQ, incisions intact, dry, clean and without  hematoma.   :  no scrotal hematoma  EXTREM: WWP, no edema, no calf tenderness  VASC: No cyanosis, pallor. Palpable radial and PT bilaterally  SKIN: no rash  PSYCH: some intermittent confusion continues, redirectable      LABS:  CBC Full  -  ( 19 Aug 2020 12:36 )  WBC Count : 7.07 K/uL  RBC Count : 3.23 M/uL  Hemoglobin : 9.4 g/dL  Hematocrit : 29.3 %  Platelet Count - Automated : 122 K/uL  Mean Cell Volume : 90.7 fl  Mean Cell Hemoglobin : 29.1 pg  Mean Cell Hemoglobin Concentration : 32.1 gm/dL  Auto Neutrophil # : x  Auto Lymphocyte # : x  Auto Monocyte # : x  Auto Eosinophil # : x  Auto Basophil # : x  Auto Neutrophil % : x  Auto Lymphocyte % : x  Auto Monocyte % : x  Auto Eosinophil % : x  Auto Basophil % : x    08-19    140  |  111<H>  |  39<H>  ----------------------------<  105<H>  4.4   |  19<L>  |  1.39<H>    Ca    9.3      19 Aug 2020 06:28  Phos  2.3     08-19  Mg     1.8     08-19      PT/INR - ( 17 Aug 2020 23:55 )   PT: 13.7 sec;   INR: 1.15          PTT - ( 17 Aug 2020 23:55 )  PTT:35.2 sec      RADIOLOGY & ADDITIONAL STUDIES:

## 2020-08-20 LAB
ANION GAP SERPL CALC-SCNC: 11 MMOL/L — SIGNIFICANT CHANGE UP (ref 5–17)
APPEARANCE UR: CLEAR — SIGNIFICANT CHANGE UP
BILIRUB UR-MCNC: NEGATIVE — SIGNIFICANT CHANGE UP
BUN SERPL-MCNC: 36 MG/DL — HIGH (ref 7–23)
C DIFF GDH STL QL: NEGATIVE — SIGNIFICANT CHANGE UP
C DIFF GDH STL QL: SIGNIFICANT CHANGE UP
CALCIUM SERPL-MCNC: 9.7 MG/DL — SIGNIFICANT CHANGE UP (ref 8.4–10.5)
CHLORIDE SERPL-SCNC: 111 MMOL/L — HIGH (ref 96–108)
CO2 SERPL-SCNC: 20 MMOL/L — LOW (ref 22–31)
COLOR SPEC: YELLOW — SIGNIFICANT CHANGE UP
CREAT SERPL-MCNC: 1.33 MG/DL — HIGH (ref 0.5–1.3)
DIFF PNL FLD: ABNORMAL
GLUCOSE SERPL-MCNC: 117 MG/DL — HIGH (ref 70–99)
GLUCOSE UR QL: NEGATIVE — SIGNIFICANT CHANGE UP
HCT VFR BLD CALC: 30.6 % — LOW (ref 39–50)
HCT VFR BLD CALC: 33.7 % — LOW (ref 39–50)
HGB BLD-MCNC: 10 G/DL — LOW (ref 13–17)
HGB BLD-MCNC: 10.4 G/DL — LOW (ref 13–17)
KETONES UR-MCNC: NEGATIVE — SIGNIFICANT CHANGE UP
LEUKOCYTE ESTERASE UR-ACNC: ABNORMAL
MAGNESIUM SERPL-MCNC: 2.3 MG/DL — SIGNIFICANT CHANGE UP (ref 1.6–2.6)
MCHC RBC-ENTMCNC: 29.2 PG — SIGNIFICANT CHANGE UP (ref 27–34)
MCHC RBC-ENTMCNC: 29.4 PG — SIGNIFICANT CHANGE UP (ref 27–34)
MCHC RBC-ENTMCNC: 30.9 GM/DL — LOW (ref 32–36)
MCHC RBC-ENTMCNC: 32.7 GM/DL — SIGNIFICANT CHANGE UP (ref 32–36)
MCV RBC AUTO: 89.5 FL — SIGNIFICANT CHANGE UP (ref 80–100)
MCV RBC AUTO: 95.2 FL — SIGNIFICANT CHANGE UP (ref 80–100)
NITRITE UR-MCNC: NEGATIVE — SIGNIFICANT CHANGE UP
NRBC # BLD: 0 /100 WBCS — SIGNIFICANT CHANGE UP (ref 0–0)
NRBC # BLD: 0 /100 WBCS — SIGNIFICANT CHANGE UP (ref 0–0)
PH UR: 6 — SIGNIFICANT CHANGE UP (ref 5–8)
PHOSPHATE SERPL-MCNC: 2.9 MG/DL — SIGNIFICANT CHANGE UP (ref 2.5–4.5)
PLATELET # BLD AUTO: 151 K/UL — SIGNIFICANT CHANGE UP (ref 150–400)
PLATELET # BLD AUTO: 157 K/UL — SIGNIFICANT CHANGE UP (ref 150–400)
POTASSIUM SERPL-MCNC: 4.1 MMOL/L — SIGNIFICANT CHANGE UP (ref 3.5–5.3)
POTASSIUM SERPL-SCNC: 4.1 MMOL/L — SIGNIFICANT CHANGE UP (ref 3.5–5.3)
PROT UR-MCNC: 30 MG/DL
RBC # BLD: 3.42 M/UL — LOW (ref 4.2–5.8)
RBC # BLD: 3.54 M/UL — LOW (ref 4.2–5.8)
RBC # FLD: 14.5 % — SIGNIFICANT CHANGE UP (ref 10.3–14.5)
RBC # FLD: 14.7 % — HIGH (ref 10.3–14.5)
SODIUM SERPL-SCNC: 142 MMOL/L — SIGNIFICANT CHANGE UP (ref 135–145)
SP GR SPEC: 1.02 — SIGNIFICANT CHANGE UP (ref 1–1.03)
TROPONIN T SERPL-MCNC: <0.01 NG/ML — SIGNIFICANT CHANGE UP (ref 0–0.01)
UROBILINOGEN FLD QL: 1 E.U./DL — SIGNIFICANT CHANGE UP
WBC # BLD: 6.24 K/UL — SIGNIFICANT CHANGE UP (ref 3.8–10.5)
WBC # BLD: 7.11 K/UL — SIGNIFICANT CHANGE UP (ref 3.8–10.5)
WBC # FLD AUTO: 6.24 K/UL — SIGNIFICANT CHANGE UP (ref 3.8–10.5)
WBC # FLD AUTO: 7.11 K/UL — SIGNIFICANT CHANGE UP (ref 3.8–10.5)

## 2020-08-20 PROCEDURE — 70450 CT HEAD/BRAIN W/O DYE: CPT | Mod: 26

## 2020-08-20 PROCEDURE — 73130 X-RAY EXAM OF HAND: CPT | Mod: 26,RT,76

## 2020-08-20 PROCEDURE — 99221 1ST HOSP IP/OBS SF/LOW 40: CPT | Mod: 25

## 2020-08-20 RX ORDER — CEPHALEXIN 500 MG
500 CAPSULE ORAL EVERY 12 HOURS
Refills: 0 | Status: DISCONTINUED | OUTPATIENT
Start: 2020-08-20 | End: 2020-08-23

## 2020-08-20 RX ORDER — SODIUM CHLORIDE 9 MG/ML
500 INJECTION, SOLUTION INTRAVENOUS ONCE
Refills: 0 | Status: COMPLETED | OUTPATIENT
Start: 2020-08-20 | End: 2020-08-20

## 2020-08-20 RX ORDER — HALOPERIDOL DECANOATE 100 MG/ML
0.5 INJECTION INTRAMUSCULAR ONCE
Refills: 0 | Status: COMPLETED | OUTPATIENT
Start: 2020-08-20 | End: 2020-08-20

## 2020-08-20 RX ADMIN — Medication 500 MILLIGRAM(S): at 22:10

## 2020-08-20 RX ADMIN — Medication 1 TABLET(S): at 22:10

## 2020-08-20 RX ADMIN — Medication 1 TABLET(S): at 23:33

## 2020-08-20 RX ADMIN — HALOPERIDOL DECANOATE 0.5 MILLIGRAM(S): 100 INJECTION INTRAMUSCULAR at 02:43

## 2020-08-20 RX ADMIN — Medication 5 MILLIGRAM(S): at 22:10

## 2020-08-20 RX ADMIN — ATORVASTATIN CALCIUM 80 MILLIGRAM(S): 80 TABLET, FILM COATED ORAL at 22:10

## 2020-08-20 RX ADMIN — TAMSULOSIN HYDROCHLORIDE 0.4 MILLIGRAM(S): 0.4 CAPSULE ORAL at 22:10

## 2020-08-20 RX ADMIN — SODIUM CHLORIDE 500 MILLILITER(S): 9 INJECTION, SOLUTION INTRAVENOUS at 09:44

## 2020-08-20 RX ADMIN — Medication 500 MILLIGRAM(S): at 13:29

## 2020-08-20 NOTE — PROVIDER CONTACT NOTE (CHANGE IN STATUS NOTIFICATION) - ASSESSMENT
Patient's RLQ noted to be increasingly tender to touch and swollen. Labs sent @ 1600
Pt BP 85/55, HR 92 RR 20 SP02 94% on 2 L nasal cannula. Pt had another episode of diarrhea

## 2020-08-20 NOTE — PROVIDER CONTACT NOTE (OTHER) - ACTION/TREATMENT ORDERED:
COLE Cardona came to see pt. 0.9 NS 250ml bolus to run 30 minutes ordered and given. CBC, BMP, Mg, Phos, Troponin, CKMB, and Creatinine Kinase ordered and collected. 12 lead EKG performed.
Continue to monitor pt, wait for AM lab as per Erika GALVAN. Will endorse to primary RN.
Will continue to monitor.

## 2020-08-20 NOTE — PROVIDER CONTACT NOTE (FALL NOTIFICATION) - ACTION/TREATMENT ORDERED:
Patient will have a bedside xray of his right hand, EKG and CT head.  Patient's daughter and wife were notified.

## 2020-08-20 NOTE — PROVIDER CONTACT NOTE (CHANGE IN STATUS NOTIFICATION) - SITUATION
Pt hypotensive, lethargic. Multiple bowel movements overnight. Pt on rule out c. difficile precautions.

## 2020-08-20 NOTE — CONSULT NOTE ADULT - SUBJECTIVE AND OBJECTIVE BOX
85M PMHx CKD, stroke, CAD, afib on eliquis admitted for open right inguinal hernia repair and appendectomy, now POD#3. Course complicated by abd wall hematoma and wash out on POD#0. Pt was found on bathroom floor by roommate this afternoon shortly after walking with PT. At this time pt was noted to have a right thumb deformity and pain. Denies similar events in the past. RHD. Denies numbness/tingling/paresthesias to right hand.     PE: Comfortable, lying in bed, pleasant Chinese speaking male  RUE: +deformity to thumb at MCP, no open wounds, no swelling, no abrasions. Wiggles all other digits without difficulty or pain. Cap refill brisk RUE. Radial pulse palpable. Skin WWP.     XR confirms right thumb dislocation, no fracture     A/P 85M with right thumb dislocation s/p unwitnessed fall in his inpatient bathroom  - Pt was given digital block with lidocaine, tolerated well  - Traction-counter traction applied to reduce fracture without AE, pt tolerated procedure well  - Removable thumb spica splint placed after procedure. Pt able to wiggle thumb without difficulty/stiffness/pain after reduction performed.  - Post reduction XR reviewed.  - Pt can continue brace and follow up with Dr. David Victor at [facility] [phone] at an outpatient    Ortho [phone]

## 2020-08-20 NOTE — CHART NOTE - NSCHARTNOTEFT_GEN_A_CORE
Called bedside approx 130PM after patient w/ unwitnessed fall while walking to bathroom. When reached patient he was in bed with an obvious deformity of his R thumb but no surrounding inflammation. Has been intermittently delirious last few days, but was AAOX4 without neurologic deficits on exam. EKG non-concerning and trop WNL. Xray hand obtained showing R thumb dislocation and was subsequently reduced by orthopedics with repeat Xray confirming. Hgb has been stable and pt unlikely to have continued bleeding. Head CT showing chronic changes from previous CVA but negative for acute injury. Pt making good urine, not dehydrated. Fall most likely 2/2 deconditioning (PT was ordered earlier today prior to fall and will see patient). Patient's daughter was later bedside and aware of situation. She says her father's mental acuity does seem mildly decreased compared to his baseline, but reports he does have some baseline forgetfulness and difficulty ambulating. Ordered enhanced observation for patient and will continue to monitor. Dr. Gomez aware of situation. Will continue to hold AC as patient was previously RTOR for bleeding and is a fall risk currently.

## 2020-08-20 NOTE — PROVIDER CONTACT NOTE (FALL NOTIFICATION) - SITUATION
CALL TO ROOM BY BEN WILEY PT. ON BATHROOM floor laying on his right side, pt was awake and responsive but remained confused. Patient was laying in feces

## 2020-08-20 NOTE — PROVIDER CONTACT NOTE (FALL NOTIFICATION) - ASSESSMENT
Head to toe assessment done and noted right thumb was awkwardly bent but patient denied pain.  No other visible injuries noted.  V/S 136/79, HR 74 NSR, 98% on room air.  No signs of resp distress.  IV left arm was dislodged.

## 2020-08-20 NOTE — PROVIDER CONTACT NOTE (FALL NOTIFICATION) - BACKGROUND
Patient was taken OOB by physical therapy to the chair prior to being found on the floor.  Patient is admitted for open inguinal hernia repair  with a baseline confusion.  LBM since last night

## 2020-08-20 NOTE — PROGRESS NOTE ADULT - SUBJECTIVE AND OBJECTIVE BOX
STATUS POST:  POD1 from open inguinal hernia (R) through midline incision, umbilical hernia repair, and appendectomy, complicated by bleed with RTOR     SUBJECTIVE: O/n: loose BM x 2, disorientated around 2am given haldol, brief 15 sec episode of SVT which self resolved, Today, Patient seen and examined bedside by chief resident. Patient delirious.    tamsulosin 0.4 milliGRAM(s) Oral at bedtime      Vital Signs Last 24 Hrs  T(C): 37 (20 Aug 2020 05:00), Max: 37 (20 Aug 2020 05:00)  T(F): 98.6 (20 Aug 2020 05:00), Max: 98.6 (20 Aug 2020 05:00)  HR: 96 (20 Aug 2020 03:38) (68 - 100)  BP: 103/62 (20 Aug 2020 03:38) (102/58 - 169/77)  BP(mean): 78 (20 Aug 2020 03:38) (74 - 112)  RR: 20 (20 Aug 2020 03:38) (18 - 22)  SpO2: 94% (20 Aug 2020 03:38) (90% - 97%)  I&O's Detail    19 Aug 2020 07:01  -  20 Aug 2020 07:00  --------------------------------------------------------  IN:    lactated ringers.: 1400 mL    Oral Fluid: 240 mL  Total IN: 1640 mL    OUT:    Drain: 120 mL    Voided: 2050 mL  Total OUT: 2170 mL    Total NET: -530 mL          Physical Exam:  General: No acute distress, resting comfortably in bed  Neuro: Able to follow commands. Symmetrical eye brown raising, puffing cheeks, and smile. Raised both arms symmetrically. Bilateral hand tremor noted and unchanged from yesterday.  C/V: normal sinus rhythm  Pulm: Nonlabored breathing, no respiratory distress  Abd: soft, non-tender, non-distended, incisions clean/dry/intact, R SILVIA with sanguinous output  Extrem: SCDs in place    LABS:                        10.0   7.11  )-----------( 151      ( 20 Aug 2020 05:48 )             30.6     08-20    142  |  111<H>  |  36<H>  ----------------------------<  117<H>  4.1   |  20<L>  |  1.33<H>    Ca    9.7      20 Aug 2020 05:48  Phos  2.9     08-20  Mg     2.3     08-20            RADIOLOGY & ADDITIONAL STUDIES: STATUS POST:  POD3 from open inguinal hernia (R) through midline incision, umbilical hernia repair, and appendectomy, complicated by bleed with RTOR     SUBJECTIVE: O/n: loose BM x 2, disorientated around 2am given haldol, brief 15 sec episode of SVT which self resolved, Today, Patient seen and examined bedside by chief resident. Patient delirious.    tamsulosin 0.4 milliGRAM(s) Oral at bedtime      Vital Signs Last 24 Hrs  T(C): 37 (20 Aug 2020 05:00), Max: 37 (20 Aug 2020 05:00)  T(F): 98.6 (20 Aug 2020 05:00), Max: 98.6 (20 Aug 2020 05:00)  HR: 96 (20 Aug 2020 03:38) (68 - 100)  BP: 103/62 (20 Aug 2020 03:38) (102/58 - 169/77)  BP(mean): 78 (20 Aug 2020 03:38) (74 - 112)  RR: 20 (20 Aug 2020 03:38) (18 - 22)  SpO2: 94% (20 Aug 2020 03:38) (90% - 97%)  I&O's Detail    19 Aug 2020 07:01  -  20 Aug 2020 07:00  --------------------------------------------------------  IN:    lactated ringers.: 1400 mL    Oral Fluid: 240 mL  Total IN: 1640 mL    OUT:    Drain: 120 mL    Voided: 2050 mL  Total OUT: 2170 mL    Total NET: -530 mL          Physical Exam:  General: No acute distress, resting comfortably in bed  Neuro: Able to follow commands. Symmetrical eye brown raising, puffing cheeks, and smile. Raised both arms symmetrically. Bilateral hand tremor noted and unchanged from yesterday.  C/V: normal sinus rhythm  Pulm: Nonlabored breathing, no respiratory distress  Abd: soft, non-tender, non-distended, incisions clean/dry/intact, R SILVIA with sanguinous output  Extrem: SCDs in place    LABS:                        10.0   7.11  )-----------( 151      ( 20 Aug 2020 05:48 )             30.6     08-20    142  |  111<H>  |  36<H>  ----------------------------<  117<H>  4.1   |  20<L>  |  1.33<H>    Ca    9.7      20 Aug 2020 05:48  Phos  2.9     08-20  Mg     2.3     08-20            RADIOLOGY & ADDITIONAL STUDIES:

## 2020-08-20 NOTE — PROGRESS NOTE ADULT - ASSESSMENT
Pt is an 84 y/o M with pmhx of CKD (Cr 1.59 pre-op), hx of CVA on eliquis at home which was held starting 8/15, CAD, AFib, HTN, iliac artery anuerysm, now s/p elective procedure for repair of large right inguinal hernia  complicated by bleed with RTOR, stepped down from SICU yesterday. This AM, he appears delirious. WBC stable at 7. Had two loose BMs, will send C Diff. Will also perform UA. Per wife, pt has baseline issues with memory. Physical exam was limited but appeared to have symmetrical face function bilaterally.    C Diff/ Clean catch UA today  CLD  LR 70  Pain/nausea control  OOBA with assistance/IS/SCDs

## 2020-08-20 NOTE — PROVIDER CONTACT NOTE (CHANGE IN STATUS NOTIFICATION) - ACTION/TREATMENT ORDERED:
ice pack applied as ordered, dilaudid gave pt relief. No further intervention at this time. will continue to monitor.
PA at bedside to assess pt. New 20 G IV placed on left arm. 500 cc bolus given. Pt cleaned and sample for c.difficile collected. CBC sent. ADONAY Yoo notified.

## 2020-08-20 NOTE — CHART NOTE - NSCHARTNOTEFT_GEN_A_CORE
Admitting Diagnosis:   Patient is a 85y old  Male who presents with a chief complaint of     Consult: Yes [   ]  No [ x  ]    Reason for Initial Nutrition Assessment: LOS Nutrition Assessment    PAST MEDICAL & SURGICAL HISTORY:  Atrial fibrillation  Confused: at night  Aneurysm of abdominal vessel  Polycystic kidney  Hyperparathyroidism  CVA (cerebral vascular accident): 2019  H/O prostate cancer  CKD (chronic kidney disease), stage III  HLD (hyperlipidemia)  HTN (hypertension)  CAD (coronary artery disease)  Atherosclerosis of aorto-iliac bypass graft: stent  S/P AAA repair  AICD (automatic cardioverter/defibrillator) present: [employer]  H/O hernia repair    Current Nutrition Order: CLD    PO Intake: Good (%) [   ]  Fair (50-75%) [   ] Poor (<25%) [   ]- Per disc with RN, pt refusing to eat this morning. Tray remains at bedside untouched.     GI Issues:   WDL, last BM 8/20  No n/v/d/c noted  No chewing or swallowing difficulties    Pain:  No pain noted at this time    Skin Integrity:  Surgical incision, deric score 16  +1/ trace edema left/ right hands  No pressure ulcers noted    Labs:   08-20    142  |  111<H>  |  36<H>  ----------------------------<  117<H>  4.1   |  20<L>  |  1.33<H>    Ca    9.7      20 Aug 2020 05:48  Phos  2.9     08-20  Mg     2.3     08-20    Medications:  MEDICATIONS  (STANDING):  atorvastatin 80 milliGRAM(s) Oral daily  lactated ringers. 1000 milliLiter(s) (115 mL/Hr) IV Continuous <Continuous>  melatonin 5 milliGRAM(s) Oral at bedtime  tamsulosin 0.4 milliGRAM(s) Oral at bedtime    MEDICATIONS  (PRN):  acetaminophen 300 mG/codeine 30 mG 1 Tablet(s) Oral every 4 hours PRN Severe Pain (7 - 10)  HYDROmorphone  Injectable 0.5 milliGRAM(s) IV Push every 6 hours PRN Moderate Pain (4 - 6)  ondansetron Injectable 4 milliGRAM(s) IV Push every 8 hours PRN Nausea    Admitted Anthropometrics:  Height: 71" Weight: 166lbs, IBW 172lbs+/-10%, %IBW 97%, BMI 23.1 kg/m2    Weight:  8/17 166lbs    Weight Change:     Nutrition Focused Physical Exam: Completed [   ]  Unable to complete [   ]  Muscle Wasting:  Subcutaneous Fat Wasting:    Estimated energy needs:     Subjective:     Nutrition Diagnosis:    [  ] No active nutrition diagnosis at this time  [  ] Current medical condition precludes nutrition intervention    Goal:    Recommendations:    Education:     Risk Level: High [   ] Moderate [   ] Low [   ] Admitting Diagnosis:   Patient is a 85y old  Male who presents with a chief complaint of     Consult: Yes [   ]  No [ x  ]    Reason for Initial Nutrition Assessment: LOS Nutrition Assessment    PAST MEDICAL & SURGICAL HISTORY:  Atrial fibrillation  Confused: at night  Aneurysm of abdominal vessel  Polycystic kidney  Hyperparathyroidism  CVA (cerebral vascular accident): 2019  H/O prostate cancer  CKD (chronic kidney disease), stage III  HLD (hyperlipidemia)  HTN (hypertension)  CAD (coronary artery disease)  Atherosclerosis of aorto-iliac bypass graft: stent  S/P AAA repair  AICD (automatic cardioverter/defibrillator) present: [employer]  H/O hernia repair    Current Nutrition Order: CLD    PO Intake: Good (%) [   ]  Fair (50-75%) [   ] Poor (<25%) [   ]- Per disc with RN, pt refusing to eat this morning. Tray remains at bedside untouched.     GI Issues:   WDL, last BM 8/20 (x2 episodes of diarrhea noted O/N)  No n/v/c noted  No chewing or swallowing difficulties    Pain:  No pain noted at this time    Skin Integrity:  Surgical incision, deric score 16  +1/ trace edema left/ right hands  No pressure ulcers noted    Labs:   08-20    142  |  111<H>  |  36<H>  ----------------------------<  117<H>  4.1   |  20<L>  |  1.33<H>    Ca    9.7      20 Aug 2020 05:48  Phos  2.9     08-20  Mg     2.3     08-20    Medications:  MEDICATIONS  (STANDING):  atorvastatin 80 milliGRAM(s) Oral daily  lactated ringers. 1000 milliLiter(s) (115 mL/Hr) IV Continuous <Continuous>  melatonin 5 milliGRAM(s) Oral at bedtime  tamsulosin 0.4 milliGRAM(s) Oral at bedtime    MEDICATIONS  (PRN):  acetaminophen 300 mG/codeine 30 mG 1 Tablet(s) Oral every 4 hours PRN Severe Pain (7 - 10)  HYDROmorphone  Injectable 0.5 milliGRAM(s) IV Push every 6 hours PRN Moderate Pain (4 - 6)  ondansetron Injectable 4 milliGRAM(s) IV Push every 8 hours PRN Nausea    Admitted Anthropometrics:  Height: 71" Weight: 166lbs, IBW 172lbs+/-10%, %IBW 97%, BMI 23.1 kg/m2    Weight:  8/17 166lbs    Weight Change: No new weights obtained this admission and no other hospital admissions/ recorded weights. Pt unable to provide UBW 2/2 AMS. Please cont to trend weights weekly.      Nutrition Focused Physical Exam: Completed [   ]  Unable to complete [   ]- Unremarkable.     Estimated energy needs:   ABW (75kg) used for calculations as pt between % of IBW. Needs adjusted for advanced age.   Kcal (20-25 kcal/kg): 9081-7832 ml   Protein (1.0-1.2 g/kg pro): 75-90 g pro  Fluids (30-35 ml/kg): 9923-6028 ml    Subjective:   85M with hx of CKD (Cr 1.59 pre-op), hx of CVA on eliquis at home which was held starting 8/15, CAD, AFib, HTN, iliac artery anuerysm, now s/p elective procedure for repair of large right inguinal hernia complicated by bleed with RTOR 8/17, S/p evac of hematoma 8/18 stepped down from SICU yesterday.  This AM, he appears delirious. Per disc with team pt was up all night, had x2 loose BMs. C.Diff negative, UA pending per RN. Per wife, pt has baseline issues with memory. On assessment, pt was resting in bed comfortably. Pt remains delirious and interview was limited. Attempted to call wife but unable to reach. Hx provided by EMR and disc with team. Currently on CLD, pt refusing to eat this am and tray remains at bedside. Cont to encourage adequate PO intake when feasible. No n/v/c noted. If diarrhea persist, recommend addition of metamucil to aid with bulking up stools and change diet to low fiber. Unable to assess PO intake PTA nor UBW. NFPE was not pertinent. Education was deferred. Please see recs below. RD to follow up per protocol.     Nutrition Diagnosis: Inadequate oral intake RT decreased willingness to eat likely 2/2 AMS and poor sleep AEB Pt consuming 0% of meals    [  ] No active nutrition diagnosis at this time  [  ] Current medical condition precludes nutrition intervention    Goal: Consistently meet >75% est needs    Recommendations:  1. CLD  >> Recommend advance to regular diet to encourage PO intake when feasible (monitor renal status).   >>If diarrhea persist, recommend change to low fiber diet and addition of metamucil  2. Recommend addition of Ensure Clear BID (240 kcal, 8 g pro each can)  3. Cont to encourage adequate PO intake  4. Cont to maintain hydration status  5. Monitor lytes and replete prn    Education: Education deferred 2/2 AMS    Risk Level: High [ x  ] Moderate [   ] Low [   ]

## 2020-08-20 NOTE — PROVIDER CONTACT NOTE (OTHER) - ASSESSMENT
Vital signs: BP 58/40 (Right arm) 61/42 (Left arm) HR 58 RR 18 O2 sat 96% Temp 97.6 rectally (was not able to get a temperature reading orally). Pt was initially in a slouched position in bed. Repositioned pt. BP was rechecked and was still low (it's the one noted here and in v/s flowsheet). Pt denied chest pain, sob or generalized pain. Pt stated "I feel tired" and would fall back asleep after answering questions. RLQ of abdomen appeared distended (unsure if that was pt's baseline. It was not given in report from day shift RN).
Neuro at baseline, confused at times, VSS.
Pt had received 1 time order of haldol, pt calm. HR 94, /58, RR20, O2 96% on RA.

## 2020-08-21 LAB
ANION GAP SERPL CALC-SCNC: 11 MMOL/L — SIGNIFICANT CHANGE UP (ref 5–17)
BUN SERPL-MCNC: 41 MG/DL — HIGH (ref 7–23)
CALCIUM SERPL-MCNC: 9.6 MG/DL — SIGNIFICANT CHANGE UP (ref 8.4–10.5)
CHLORIDE SERPL-SCNC: 112 MMOL/L — HIGH (ref 96–108)
CO2 SERPL-SCNC: 21 MMOL/L — LOW (ref 22–31)
CREAT SERPL-MCNC: 1.31 MG/DL — HIGH (ref 0.5–1.3)
GLUCOSE SERPL-MCNC: 119 MG/DL — HIGH (ref 70–99)
HCT VFR BLD CALC: 28 % — LOW (ref 39–50)
HGB BLD-MCNC: 9.1 G/DL — LOW (ref 13–17)
MAGNESIUM SERPL-MCNC: 2.2 MG/DL — SIGNIFICANT CHANGE UP (ref 1.6–2.6)
MCHC RBC-ENTMCNC: 29 PG — SIGNIFICANT CHANGE UP (ref 27–34)
MCHC RBC-ENTMCNC: 32.5 GM/DL — SIGNIFICANT CHANGE UP (ref 32–36)
MCV RBC AUTO: 89.2 FL — SIGNIFICANT CHANGE UP (ref 80–100)
NRBC # BLD: 0 /100 WBCS — SIGNIFICANT CHANGE UP (ref 0–0)
PHOSPHATE SERPL-MCNC: 2.9 MG/DL — SIGNIFICANT CHANGE UP (ref 2.5–4.5)
PLATELET # BLD AUTO: 143 K/UL — LOW (ref 150–400)
POTASSIUM SERPL-MCNC: 3.6 MMOL/L — SIGNIFICANT CHANGE UP (ref 3.5–5.3)
POTASSIUM SERPL-SCNC: 3.6 MMOL/L — SIGNIFICANT CHANGE UP (ref 3.5–5.3)
RBC # BLD: 3.14 M/UL — LOW (ref 4.2–5.8)
RBC # FLD: 14.5 % — SIGNIFICANT CHANGE UP (ref 10.3–14.5)
SODIUM SERPL-SCNC: 144 MMOL/L — SIGNIFICANT CHANGE UP (ref 135–145)
WBC # BLD: 4.29 K/UL — SIGNIFICANT CHANGE UP (ref 3.8–10.5)
WBC # FLD AUTO: 4.29 K/UL — SIGNIFICANT CHANGE UP (ref 3.8–10.5)

## 2020-08-21 PROCEDURE — 99222 1ST HOSP IP/OBS MODERATE 55: CPT

## 2020-08-21 RX ORDER — METRONIDAZOLE 500 MG
500 TABLET ORAL THREE TIMES A DAY
Refills: 0 | Status: DISCONTINUED | OUTPATIENT
Start: 2020-08-21 | End: 2020-08-26

## 2020-08-21 RX ORDER — POTASSIUM CHLORIDE 20 MEQ
10 PACKET (EA) ORAL
Refills: 0 | Status: COMPLETED | OUTPATIENT
Start: 2020-08-21 | End: 2020-08-21

## 2020-08-21 RX ADMIN — Medication 500 MILLIGRAM(S): at 18:28

## 2020-08-21 RX ADMIN — ATORVASTATIN CALCIUM 80 MILLIGRAM(S): 80 TABLET, FILM COATED ORAL at 18:28

## 2020-08-21 RX ADMIN — Medication 500 MILLIGRAM(S): at 21:33

## 2020-08-21 RX ADMIN — TAMSULOSIN HYDROCHLORIDE 0.4 MILLIGRAM(S): 0.4 CAPSULE ORAL at 21:33

## 2020-08-21 RX ADMIN — Medication 500 MILLIGRAM(S): at 06:14

## 2020-08-21 RX ADMIN — Medication 100 MILLIEQUIVALENT(S): at 14:13

## 2020-08-21 RX ADMIN — Medication 5 MILLIGRAM(S): at 21:33

## 2020-08-21 RX ADMIN — Medication 500 MILLIGRAM(S): at 16:25

## 2020-08-21 RX ADMIN — Medication 100 MILLIEQUIVALENT(S): at 12:17

## 2020-08-21 NOTE — CONSULT NOTE ADULT - SUBJECTIVE AND OBJECTIVE BOX
NEUROLOGY CONSULT    HPI:  Pt is an 84 y/o M with pmhx of CKD, hx of CVA on eliquis at home which was held starting 8/15, CAD, AFib, HTN, iliac artery anuerysm, presents here today for elective procedure for repair of large right inguinal hernia. Pt was cleared by multiple specialists including nephrology, hematology, neurology, cardiology, and PCP. Pt was advised to take lovenox BID saturday and  morning. Procedure will be performed using general anesthesia after anesthesia preop consult.      HOSPITAL COURSE:    SUBJECTIVE:    REVIEW OF SYSTEMS:  Constitutional: No fever, chills, fatigue, weakness  Eyes: no eye pain, visual disturbances, or discharge  ENT:  No difficulty hearing, tinnitus, vertigo; No sinus or throat pain  Neck: No pain or stiffness  Respiratory: No cough, dyspnea, wheezing   Cardiovascular: No chest pain, palpitations,   Gastrointestinal: No abdominal or epigastric pain. No nausea, vomiting  No diarrhea or constipation.   Genitourinary: No dysuria, frequency, hematuria or incontinence  Neurological: No headaches, lightheadedness, vertigo, numbness or tremors  Psychiatric: No depression, anxiety, mood swings or difficulty sleeping  Musculoskeletal: No joint pain or swelling; No muscle, back or extremity pain  Skin: No itching, burning, rashes or lesions   Lymph Nodes: No enlarged glands  Endocrine: No heat or cold intolerance; No hair loss, No h/o diabetes or thyroid dysfunction  Allergy and Immunologic: No hives or eczema    MEDICATIONS  Home Medications:    MEDICATIONS  (STANDING):  atorvastatin 80 milliGRAM(s) Oral daily  cephalexin 500 milliGRAM(s) Oral every 12 hours  lactated ringers. 1000 milliLiter(s) (115 mL/Hr) IV Continuous <Continuous>  melatonin 5 milliGRAM(s) Oral at bedtime  metroNIDAZOLE    Tablet 500 milliGRAM(s) Oral three times a day  tamsulosin 0.4 milliGRAM(s) Oral at bedtime    MEDICATIONS  (PRN):  acetaminophen 300 mG/codeine 30 mG 1 Tablet(s) Oral every 4 hours PRN Severe Pain (7 - 10)  HYDROmorphone  Injectable 0.5 milliGRAM(s) IV Push every 6 hours PRN Moderate Pain (4 - 6)  ondansetron Injectable 4 milliGRAM(s) IV Push every 8 hours PRN Nausea      FAMILY HISTORY:    SOCIAL HISTORY: negative for tobacco, alcohol, or ilicit drug use.    Allergies    No Known Allergies    Intolerances            Vital Signs Last 24 Hrs  T(C): 36.9 (21 Aug 2020 13:05), Max: 36.9 (20 Aug 2020 21:42)  T(F): 98.4 (21 Aug 2020 13:05), Max: 98.5 (20 Aug 2020 21:42)  HR: 74 (21 Aug 2020 16:27) (74 - 94)  BP: 112/71 (21 Aug 2020 16:27) (99/58 - 203/126)  BP(mean): 88 (21 Aug 2020 16:27) (72 - 105)  RR: 17 (21 Aug 2020 16:27) (17 - 19)  SpO2: 97% (21 Aug 2020 16:27) (93% - 99%)      PHYSICAL EXAMINATION:  General: Comfortable, pleasant/anxious/agitated, Ill-appearing, well-nourished/frail/cachectic, comfortable / in distress  Neurologic:     -Mental Status: AAOx3. Mildly slowed cognitive but follows commands, Georgian speaking.      -Cranial Nerves:          II: Visual fields are full to confrontation.          III, IV, VI: EOMI without nystagmus. PERRL b/l          V:  Facial sensation V1-V3 equal and intact           VII: Face is symmetric with normal eye closure and smile          VIII: Hearing is bilaterally intact to finger rub          IX, X: Uvula is midline and soft palate rises symmetrically          XI: Head turning and shoulder shrug are intact.          XII: Tongue protrudes midline     -Motor: Normal bulk and tone. No involuntary movements (tremor, myoclonus, chorea, athetosis, dystonia)          Upper extremities: shoulder abduction, elbow flexion/extension, wrist flexion/extension, handgrip          Lower extremities: hip flexion, knee extension/flexion, plantar flexion, ankle dorsiflexion          No pronator drift. Rapid alternating movements intact and symmetric     -Sensation: Intact to light touch. Pain and temperature intact. Vibration sense intact.           No neglect or extinction on double simultaneous testing.     -Coordination: No dysmetria on finger-to-nose and heel-to-shin intact bilaterally, rapid alternating hand movements intact     -Reflexes: 2+ and symmetric at biceps, triceps, brachioradialis, patellar, ankles          Plantar reflexes downgoing bilaterally. Downgoing toes bilaterally      -Gait: not tested      LABS:                        9.1    4.29  )-----------( 143      ( 21 Aug 2020 06:56 )             28.0     08-21    144  |  112<H>  |  41<H>  ----------------------------<  119<H>  3.6   |  21<L>  |  1.31<H>    Ca    9.6      21 Aug 2020 06:56  Phos  2.9     08-21  Mg     2.2     08-21      Hemoglobin A1C:   Vitamin B12     CAPILLARY BLOOD GLUCOSE          Urinalysis Basic - ( 20 Aug 2020 08:01 )    Color: Yellow / Appearance: Clear / S.020 / pH: x  Gluc: x / Ketone: NEGATIVE  / Bili: Negative / Urobili: 1.0 E.U./dL   Blood: x / Protein: 30 mg/dL / Nitrite: NEGATIVE   Leuk Esterase: Moderate / RBC: > 10 /HPF / WBC < 5 /HPF   Sq Epi: x / Non Sq Epi: 0-5 /HPF / Bacteria: Present /HPF            Microbiology:      RADIOLOGY, EKG AND ADDITIONAL TESTS: Reviewed. NEUROLOGY CONSULT    HPI:  85 year old male with CKD, CVA 2019, Afib on Eliquis, CAD, HTN, who was admitted on  for elective repair of large right inguinal repair c/b bleed with ROTR. Neurology consulted for delirium.    Pt reports he is feeling much better. Daughter at bedside reports that he gets confused at night because he is in a foreign environment. He has an outside neurologist that he sees regularly since his stroke in 2019.    REVIEW OF SYSTEMS:  Constitutional: No fever, chills, fatigue, weakness  Eyes: no eye pain, visual disturbances, or discharge  ENT:  No difficulty hearing, tinnitus, vertigo; No sinus or throat pain  Neck: No pain or stiffness  Respiratory: No cough, dyspnea, wheezing   Cardiovascular: No chest pain, palpitations,   Gastrointestinal: No abdominal or epigastric pain. No nausea, vomiting  No diarrhea or constipation.   Genitourinary: No dysuria, frequency, hematuria or incontinence  Neurological: No headaches, lightheadedness, vertigo, numbness or tremors  Psychiatric: No depression, anxiety, mood swings or difficulty sleeping  Musculoskeletal: No joint pain or swelling; No muscle, back or extremity pain  Skin: No itching, burning, rashes or lesions   Lymph Nodes: No enlarged glands  Endocrine: No heat or cold intolerance; No hair loss, No h/o diabetes or thyroid dysfunction  Allergy and Immunologic: No hives or eczema    MEDICATIONS  Home Medications:    MEDICATIONS  (STANDING):  atorvastatin 80 milliGRAM(s) Oral daily  cephalexin 500 milliGRAM(s) Oral every 12 hours  lactated ringers. 1000 milliLiter(s) (115 mL/Hr) IV Continuous <Continuous>  melatonin 5 milliGRAM(s) Oral at bedtime  metroNIDAZOLE    Tablet 500 milliGRAM(s) Oral three times a day  tamsulosin 0.4 milliGRAM(s) Oral at bedtime    MEDICATIONS  (PRN):  acetaminophen 300 mG/codeine 30 mG 1 Tablet(s) Oral every 4 hours PRN Severe Pain (7 - 10)  HYDROmorphone  Injectable 0.5 milliGRAM(s) IV Push every 6 hours PRN Moderate Pain (4 - 6)  ondansetron Injectable 4 milliGRAM(s) IV Push every 8 hours PRN Nausea      FAMILY HISTORY:    SOCIAL HISTORY: negative for tobacco, alcohol, or ilicit drug use.    Allergies    No Known Allergies    Intolerances            Vital Signs Last 24 Hrs  T(C): 36.9 (21 Aug 2020 13:05), Max: 36.9 (20 Aug 2020 21:42)  T(F): 98.4 (21 Aug 2020 13:05), Max: 98.5 (20 Aug 2020 21:42)  HR: 74 (21 Aug 2020 16:27) (74 - 94)  BP: 112/71 (21 Aug 2020 16:27) (99/58 - 203/126)  BP(mean): 88 (21 Aug 2020 16:27) (72 - 105)  RR: 17 (21 Aug 2020 16:27) (17 - 19)  SpO2: 97% (21 Aug 2020 16:27) (93% - 99%)      PHYSICAL EXAMINATION:  General: Comfortable, pleasant/anxious/agitated, Ill-appearing, well-nourished/frail/cachectic, comfortable / in distress  Neurologic:     -Mental Status: AAOx3. Mildly slowed cognitive but follows commands, Argentine speaking.      -Cranial Nerves:          II: Visual fields are full to confrontation.          III, IV, VI: EOMI without nystagmus. PERRL b/l          V:  Facial sensation V1-V3 equal and intact           VII: Face is symmetric with normal eye closure and smile          VIII: Hearing is bilaterally intact to finger rub          IX, X: Uvula is midline and soft palate rises symmetrically          XI: Head turning and shoulder shrug are intact.          XII: Tongue protrudes midline     -Motor:  No involuntary movements (tremor, myoclonus, chorea, athetosis, dystonia)          Upper extremities: shoulder abduction, elbow flexion/extension, wrist flexion/extension, handgrip 5/5          Lower extremities: hip flexion, knee extension/flexion, plantar flexion, ankle dorsiflexion 5/5          No pronator drift. Rapid alternating movements intact and symmetric     -Sensation: Intact to light touch. Pain and temperature intact. Vibration sense intact.           No neglect or extinction on double simultaneous testing.     -Coordination: No dysmetria on finger-to-nose and heel-to-shin intact bilaterally, rapid alternating hand movements intact      LABS:                        9.1    4.29  )-----------( 143      ( 21 Aug 2020 06:56 )             28.0     08-21    144  |  112<H>  |  41<H>  ----------------------------<  119<H>  3.6   |  21<L>  |  1.31<H>    Ca    9.6      21 Aug 2020 06:56  Phos  2.9     08-  Mg     2.2     08-      Hemoglobin A1C:   Vitamin B12     CAPILLARY BLOOD GLUCOSE          Urinalysis Basic - ( 20 Aug 2020 08:01 )    Color: Yellow / Appearance: Clear / S.020 / pH: x  Gluc: x / Ketone: NEGATIVE  / Bili: Negative / Urobili: 1.0 E.U./dL   Blood: x / Protein: 30 mg/dL / Nitrite: NEGATIVE   Leuk Esterase: Moderate / RBC: > 10 /HPF / WBC < 5 /HPF   Sq Epi: x / Non Sq Epi: 0-5 /HPF / Bacteria: Present /HPF            Microbiology:      RADIOLOGY, EKG AND ADDITIONAL TESTS: Reviewed.      CT () with no signs of structural reasons for AMS.

## 2020-08-21 NOTE — PHYSICAL THERAPY INITIAL EVALUATION ADULT - PLANNED THERAPY INTERVENTIONS, PT EVAL
bed mobility training/gait training/postural re-education/transfer training/strengthening/balance training

## 2020-08-21 NOTE — PROGRESS NOTE ADULT - SUBJECTIVE AND OBJECTIVE BOX
STATUS POST:  POD4 from open inguinal hernia (R) through midline incision, umbilical hernia repair, and appendectomy, complicated by bleed with RTOR     SUBJECTIVE: O/N: incontinent multiple times but pvrs less than 250, vss, alayna. Patient seen and examined bedside by chief resident.     cephalexin 500 milliGRAM(s) Oral every 12 hours  tamsulosin 0.4 milliGRAM(s) Oral at bedtime      Vital Signs Last 24 Hrs  T(C): 36.7 (21 Aug 2020 05:01), Max: 36.9 (20 Aug 2020 10:00)  T(F): 98 (21 Aug 2020 05:01), Max: 98.5 (20 Aug 2020 10:00)  HR: 82 (21 Aug 2020 03:53) (76 - 100)  BP: 143/81 (21 Aug 2020 03:53) (86/55 - 153/71)  BP(mean): 105 (21 Aug 2020 03:53) (66 - 105)  RR: 18 (21 Aug 2020 03:53) (18 - 20)  SpO2: 97% (21 Aug 2020 03:53) (92% - 99%)  I&O's Detail    19 Aug 2020 07:01  -  20 Aug 2020 07:00  --------------------------------------------------------  IN:    lactated ringers.: 1400 mL    Oral Fluid: 240 mL  Total IN: 1640 mL    OUT:    Drain: 120 mL    Voided: 2050 mL  Total OUT: 2170 mL    Total NET: -530 mL      20 Aug 2020 07:01  -  21 Aug 2020 06:05  --------------------------------------------------------  IN:    lactated ringers.: 2525 mL    Oral Fluid: 100 mL  Total IN: 2625 mL    OUT:    Drain: 45 mL    Voided: 300 mL  Total OUT: 345 mL    Total NET: 2280 mL          Physical Exam:  General: No acute distress, resting comfortably in bed  C/V: normal sinus rhythm  Pulm: Nonlabored breathing, no respiratory distress  Abd: soft, non-tender, non-distended, incisions clean/dry/intact.  Extrem: warm and well perfused, no edema, SCDs in place    LABS:                        10.4   6.24  )-----------( 157      ( 20 Aug 2020 09:41 )             33.7     08-20    142  |  111<H>  |  36<H>  ----------------------------<  117<H>  4.1   |  20<L>  |  1.33<H>    Ca    9.7      20 Aug 2020 05:48  Phos  2.9     08-20  Mg     2.3     08-20        Urinalysis Basic - ( 20 Aug 2020 08:01 )    Color: Yellow / Appearance: Clear / S.020 / pH: x  Gluc: x / Ketone: NEGATIVE  / Bili: Negative / Urobili: 1.0 E.U./dL   Blood: x / Protein: 30 mg/dL / Nitrite: NEGATIVE   Leuk Esterase: Moderate / RBC: > 10 /HPF / WBC < 5 /HPF   Sq Epi: x / Non Sq Epi: 0-5 /HPF / Bacteria: Present /HPF        RADIOLOGY & ADDITIONAL STUDIES: STATUS POST: POD4 from open inguinal hernia (R) through midline incision, umbilical hernia repair, and appendectomy, complicated by bleed with RTOR     SUBJECTIVE: O/N: incontinent multiple times but pvrs less than 250, vss, alayna. Patient seen and examined bedside by chief resident. Had soft diet last night without issues. Pt reports that he has RLQ abdominal pain.    cephalexin 500 milliGRAM(s) Oral every 12 hours  tamsulosin 0.4 milliGRAM(s) Oral at bedtime      Vital Signs Last 24 Hrs  T(C): 36.7 (21 Aug 2020 05:01), Max: 36.9 (20 Aug 2020 10:00)  T(F): 98 (21 Aug 2020 05:01), Max: 98.5 (20 Aug 2020 10:00)  HR: 82 (21 Aug 2020 03:53) (76 - 100)  BP: 143/81 (21 Aug 2020 03:53) (86/55 - 153/71)  BP(mean): 105 (21 Aug 2020 03:53) (66 - 105)  RR: 18 (21 Aug 2020 03:53) (18 - 20)  SpO2: 97% (21 Aug 2020 03:53) (92% - 99%)  I&O's Detail    19 Aug 2020 07:01  -  20 Aug 2020 07:00  --------------------------------------------------------  IN:    lactated ringers.: 1400 mL    Oral Fluid: 240 mL  Total IN: 1640 mL    OUT:    Drain: 120 mL    Voided: 2050 mL  Total OUT: 2170 mL    Total NET: -530 mL      20 Aug 2020 07:01  -  21 Aug 2020 06:05  --------------------------------------------------------  IN:    lactated ringers.: 2525 mL    Oral Fluid: 100 mL  Total IN: 2625 mL    OUT:    Drain: 45 mL    Voided: 300 mL  Total OUT: 345 mL    Total NET: 2280 mL          Physical Exam:  General: No acute distress, resting comfortably in bed  Neuro: Pt still with some delirium, but appears better than yesterday  C/V: normal sinus rhythm  Pulm: Nonlabored breathing, no respiratory distress, on RA  Abd: soft, mildly TTP in RLQ, non-distended, incisions clean/dry/intact. RLQ SILVIA w/ small sanguinous output      LABS:                        10.4   6.24  )-----------( 157      ( 20 Aug 2020 09:41 )             33.7     08-20    142  |  111<H>  |  36<H>  ----------------------------<  117<H>  4.1   |  20<L>  |  1.33<H>    Ca    9.7      20 Aug 2020 05:48  Phos  2.9     08-20  Mg     2.3     08-20        Urinalysis Basic - ( 20 Aug 2020 08:01 )    Color: Yellow / Appearance: Clear / S.020 / pH: x  Gluc: x / Ketone: NEGATIVE  / Bili: Negative / Urobili: 1.0 E.U./dL   Blood: x / Protein: 30 mg/dL / Nitrite: NEGATIVE   Leuk Esterase: Moderate / RBC: > 10 /HPF / WBC < 5 /HPF   Sq Epi: x / Non Sq Epi: 0-5 /HPF / Bacteria: Present /HPF        RADIOLOGY & ADDITIONAL STUDIES:

## 2020-08-21 NOTE — PHYSICAL THERAPY INITIAL EVALUATION ADULT - PERTINENT HX OF CURRENT PROBLEM, REHAB EVAL
84 y/o M with pmhx of CKD, hx of CVA on eliquis at home which was held starting 8/15, CAD, AFib, HTN, iliac artery anuerysm, presents here today for elective procedure for repair of large right inguinal hernia. Pt was cleared by multiple specialists including nephrology, hematology, neurology, cardiology, and PCP. Pt was advised to take lovenox BID saturday and Sunday morning. Procedure will be performed using general anesthesia after anesthesia preop consult.

## 2020-08-21 NOTE — CONSULT NOTE ADULT - ASSESSMENT
85 year old male with CKD, CVA 2019, Afib on Eliquis, CAD, HTN, who was admitted on 8/17 for elective repair of large right inguinal repair c/b bleed with ROTR. Neurology consulted for altered mental status    Recommendations:  Etiology of altered mental status likely toxic metabolic and multifactorial. CTH (8/20) with no signs of structural reasons for AMS. Delirium likely from hospital stay and prolonged given co-morbidities. Mental status per daughter without any major changes s/p surgery. Slight cognitive slowing on exam but AOx3 and non focal.   -Recommend vEEG to r/o seizures  -Remainder of work up including metabolic and infectious per primary team    Discussed with attending  Juan C Saha PGY2 85 year old male with CKD, CVA 2019, Afib on Eliquis, CAD, HTN, who was admitted on 8/17 for elective repair of large right inguinal repair c/b bleed with ROTR. Neurology consulted for altered mental status    Recommendations:  Etiology of altered mental status likely toxic metabolic and multifactorial. CTH (8/20) with no signs of structural reasons for AMS. Delirium likely from hospital stay and prolonged given co-morbidities. Mental status per daughter without any major changes s/p surgery. Slight cognitive slowing on exam but AOx3 and non focal.     - work up including metabolic and infectious per primary team. can consider psych consult for medication management if agitation and sundowning is a big factor.    Call with questions.    Juan C Saha PGY2 85 year old male with CKD, CVA 2019, Afib on Eliquis, CAD, HTN, who was admitted on 8/17 for elective repair of large right inguinal repair c/b bleed with ROTR. Neurology consulted for altered mental status    Recommendations:  Etiology of altered mental status likely toxic metabolic and multifactorial. CTH (8/20) with no signs of structural reasons for AMS. Delirium likely from hospital stay and prolonged given co-morbidities. Mental status per daughter without any major changes s/p surgery. Slight cognitive slowing on exam but AOx3 and non focal.     - work up including metabolic and infectious per primary team. can consider psych consult for medication management if agitation and sundowning is a big factor.  - f/u with outpatient neurologist upon dc.    Call with questions.    Juan C Saha PGY2

## 2020-08-21 NOTE — PHYSICAL THERAPY INITIAL EVALUATION ADULT - DID THE PATIENT HAVE SURGERY?
Repair, inguinal hernia, sliding 17-Aug-2020 14:15:09  Soy Glass. Appendectomy, open 17-Aug-2020 14:15:25  Soy Glass/yes

## 2020-08-21 NOTE — PROGRESS NOTE ADULT - ASSESSMENT
Pt is an 84 y/o M with pmhx of CKD (Cr 1.59 pre-op), hx of CVA on eliquis at home which was held starting 8/15, CAD, AFib, HTN, iliac artery anuerysm, now s/p elective procedure for repair of large right inguinal hernia  complicated by bleed with RTOR, stepped down from SICU yesterday.    C Diff/ Clean catch UA today  CLD  LR 70  Pain/nausea control  OOBA with assistance/IS/SCDs Pt is an 86 y/o M with pmhx of CKD (Cr 1.59 pre-op), hx of CVA on eliquis at home which was held starting 8/15, CAD, AFib, HTN, iliac artery anuerysm, now s/p elective procedure for repair of large right inguinal hernia complicated by bleed with RTOR, and fall yesterday with work up negative for stroke or cardiac etiology. Pt appears more energetic and less delirious this AM.      Physical therapy to evaluate today  Soft diet  constant observation    Pain/nausea control  OOBA with assistance/IS/SCDs

## 2020-08-21 NOTE — CONSULT NOTE ADULT - ATTENDING COMMENTS
hypotension post procedure improved with ivf crystalloid volume resuscitation. UO adequate. mild metabolic acidosis related chiefly to CKD
I was physically present for the key portions of the evaluation and managemnent (E/M) service provided.  I agree with the above history, physical, and plan which I have reviewed and edited where appropriate, with the exceptions as per my note.    Pt with prior stroke, now with delirium related to recent surgery, narcotics, DIANNA, .    - recommend frequent orientation, reduce psychoactive meds like narcotics, avoid benzos.  - can call psych for med management if pt becomes agitated.  - management per primary team  - call with questions

## 2020-08-21 NOTE — PHYSICAL THERAPY INITIAL EVALUATION ADULT - DIAGNOSIS, PT EVAL
5A: Primary Prevention/Risk Reduction for Loss of Balance and Falling. 4I: Impaired Joint Mobility, Motor Function, Muscle Performance, and Range of Motion Associated with Bony or Soft Tissue Surgery

## 2020-08-21 NOTE — PHYSICAL THERAPY INITIAL EVALUATION ADULT - HEALTH SCREEN CRITERIA
Chief Complaint: Sore Throat


Stated Complaint: SORE THROAT/ EAR PAIN/ FEVER


Time Seen by Provider: 03/17/19 11:16





- HPI


History of Present Illness: 





2 day hx urti- sore throat


face pain/aches


fever per pt- not here


taking otc





abc intact





VSS





strep screen sent





MSE completed





- Exam


Vital Signs: 


                                   Vital Signs











  03/17/19





  11:14


 


Temperature 99.2 F


 


Pulse Rate 83


 


Respiratory 18





Rate 


 


Blood Pressure 110/76


 


O2 Sat by Pulse 100





Oximetry 











MSE screening note: 


Focused history and physical exam performed.


Due to findings the following was ordered:











ED Disposition for MSE


Condition: Stable yes

## 2020-08-22 LAB
ANION GAP SERPL CALC-SCNC: 9 MMOL/L — SIGNIFICANT CHANGE UP (ref 5–17)
BUN SERPL-MCNC: 39 MG/DL — HIGH (ref 7–23)
CALCIUM SERPL-MCNC: 9.3 MG/DL — SIGNIFICANT CHANGE UP (ref 8.4–10.5)
CHLORIDE SERPL-SCNC: 115 MMOL/L — HIGH (ref 96–108)
CO2 SERPL-SCNC: 22 MMOL/L — SIGNIFICANT CHANGE UP (ref 22–31)
CREAT SERPL-MCNC: 1.37 MG/DL — HIGH (ref 0.5–1.3)
GLUCOSE SERPL-MCNC: 124 MG/DL — HIGH (ref 70–99)
HCT VFR BLD CALC: 24.3 % — LOW (ref 39–50)
HGB BLD-MCNC: 7.7 G/DL — LOW (ref 13–17)
MAGNESIUM SERPL-MCNC: 2 MG/DL — SIGNIFICANT CHANGE UP (ref 1.6–2.6)
MCHC RBC-ENTMCNC: 28.7 PG — SIGNIFICANT CHANGE UP (ref 27–34)
MCHC RBC-ENTMCNC: 31.7 GM/DL — LOW (ref 32–36)
MCV RBC AUTO: 90.7 FL — SIGNIFICANT CHANGE UP (ref 80–100)
NRBC # BLD: 0 /100 WBCS — SIGNIFICANT CHANGE UP (ref 0–0)
PHOSPHATE SERPL-MCNC: 1.9 MG/DL — LOW (ref 2.5–4.5)
PLATELET # BLD AUTO: 135 K/UL — LOW (ref 150–400)
POTASSIUM SERPL-MCNC: 3.6 MMOL/L — SIGNIFICANT CHANGE UP (ref 3.5–5.3)
POTASSIUM SERPL-SCNC: 3.6 MMOL/L — SIGNIFICANT CHANGE UP (ref 3.5–5.3)
RBC # BLD: 2.68 M/UL — LOW (ref 4.2–5.8)
RBC # FLD: 14.3 % — SIGNIFICANT CHANGE UP (ref 10.3–14.5)
SODIUM SERPL-SCNC: 146 MMOL/L — HIGH (ref 135–145)
WBC # BLD: 5.93 K/UL — SIGNIFICANT CHANGE UP (ref 3.8–10.5)
WBC # FLD AUTO: 5.93 K/UL — SIGNIFICANT CHANGE UP (ref 3.8–10.5)

## 2020-08-22 RX ORDER — POTASSIUM PHOSPHATE, MONOBASIC POTASSIUM PHOSPHATE, DIBASIC 236; 224 MG/ML; MG/ML
21 INJECTION, SOLUTION INTRAVENOUS ONCE
Refills: 0 | Status: COMPLETED | OUTPATIENT
Start: 2020-08-22 | End: 2020-08-22

## 2020-08-22 RX ADMIN — Medication 500 MILLIGRAM(S): at 05:57

## 2020-08-22 RX ADMIN — Medication 500 MILLIGRAM(S): at 15:50

## 2020-08-22 RX ADMIN — POTASSIUM PHOSPHATE, MONOBASIC POTASSIUM PHOSPHATE, DIBASIC 41.67 MILLIMOLE(S): 236; 224 INJECTION, SOLUTION INTRAVENOUS at 10:11

## 2020-08-22 RX ADMIN — Medication 5 MILLIGRAM(S): at 22:42

## 2020-08-22 RX ADMIN — ATORVASTATIN CALCIUM 80 MILLIGRAM(S): 80 TABLET, FILM COATED ORAL at 11:12

## 2020-08-22 RX ADMIN — TAMSULOSIN HYDROCHLORIDE 0.4 MILLIGRAM(S): 0.4 CAPSULE ORAL at 22:41

## 2020-08-22 RX ADMIN — Medication 500 MILLIGRAM(S): at 22:41

## 2020-08-22 RX ADMIN — SODIUM CHLORIDE 115 MILLILITER(S): 9 INJECTION, SOLUTION INTRAVENOUS at 11:12

## 2020-08-22 RX ADMIN — Medication 500 MILLIGRAM(S): at 18:11

## 2020-08-22 NOTE — PROGRESS NOTE ADULT - ASSESSMENT
Pt is an 84 y/o M with pmhx of CKD (Cr 1.59 pre-op), hx of CVA on eliquis at home which was held starting 8/15, CAD, AFib, HTN, iliac artery anuerysm, now s/p elective procedure for repair of large right inguinal hernia complicated by bleed with RTOR, and fall yesterday with work up negative for stroke or cardiac etiology. Pt more lucid this morning, AOx3. Reports that he is feeling better. Will attempt to get PT to see him for strengthening/conditioning. Will obtain a calorie count to assess intake. On flagyl for his diarrhea although C diff was negative, will repeat test tomorrow given that hospital does not allow a repeat within three days of initial test. Will re-order EEG.    Calorie count  Obtain PT  Re-order vEEG  Regular diet  constant observation    Pain/nausea control  OOBA with assistance/IS/SCDs  Holding eliquis given recent bleed  Repeat C Diff on 8/23 Pt is an 84 y/o M with pmhx of CKD (Cr 1.59 pre-op), hx of CVA on eliquis at home which was held starting 8/15, CAD, AFib, HTN, iliac artery anuerysm, now s/p elective procedure for repair of large right inguinal hernia complicated by bleed with RTOR, and fall yesterday with work up negative for stroke or cardiac etiology. Pt more lucid this morning, AOx3. Reports that he is feeling better. Will attempt to get PT to see him for strengthening/conditioning. Will obtain a calorie count to assess intake. On flagyl for his diarrhea although C diff was negative, will repeat test tomorrow given that hospital does not allow a repeat within three days of initial test. Will re-order EEG.    Calorie count  Obtain PT  Re-order vEEG  Regular diet  constant observation    Pain/nausea control  OOBA with assistance/IS/SCDs  Holding eliquis given recent bleed  On flagyl, Repeat C Diff on 8/23

## 2020-08-22 NOTE — PROGRESS NOTE ADULT - SUBJECTIVE AND OBJECTIVE BOX
STATUS POST:  POD5 from open inguinal hernia (R) through midline incision, umbilical hernia repair, and appendectomy, complicated by bleed with RTOR     SUBJECTIVE: Pt refused video EEG last night. ZAFAR. Patient seen and examined bedside by chief resident. Pt reports that he is feeling better this AM. Still has some RLQ.      cephalexin 500 milliGRAM(s) Oral every 12 hours  metroNIDAZOLE    Tablet 500 milliGRAM(s) Oral three times a day  tamsulosin 0.4 milliGRAM(s) Oral at bedtime      Vital Signs Last 24 Hrs  T(C): 36.8 (22 Aug 2020 09:35), Max: 37.1 (22 Aug 2020 04:58)  T(F): 98.2 (22 Aug 2020 09:35), Max: 98.7 (22 Aug 2020 04:58)  HR: 72 (22 Aug 2020 11:24) (72 - 86)  BP: 141/70 (22 Aug 2020 11:24) (107/60 - 141/70)  BP(mean): 100 (22 Aug 2020 11:24) (78 - 101)  RR: 18 (22 Aug 2020 11:24) (15 - 18)  SpO2: 97% (22 Aug 2020 11:24) (96% - 97%)  I&O's Detail    21 Aug 2020 07:01  -  22 Aug 2020 07:00  --------------------------------------------------------  IN:    lactated ringers.: 1265 mL    Oral Fluid: 340 mL  Total IN: 1605 mL    OUT:    Drain: 30 mL    Incontinent per Condom Catheter: 1000 mL  Total OUT: 1030 mL    Total NET: 575 mL          Physical Exam:  General: No acute distress, resting comfortably in bed  Neuro: AOx3 (person, place, date)  C/V: normal sinus rhythm  Pulm: Nonlabored breathing, no respiratory distress, on RA  Abd: soft, mildly-distended, incisions clean/dry/intact. RLQ w/ minimal serosanguinous output  Extrem: SCDs in place    LABS:                        7.7    5.93  )-----------( 135      ( 22 Aug 2020 05:55 )             24.3     08-22    146<H>  |  115<H>  |  39<H>  ----------------------------<  124<H>  3.6   |  22  |  1.37<H>    Ca    9.3      22 Aug 2020 05:55  Phos  1.9     08-22  Mg     2.0     08-22            RADIOLOGY & ADDITIONAL STUDIES:

## 2020-08-23 ENCOUNTER — TRANSCRIPTION ENCOUNTER (OUTPATIENT)
Age: 85
End: 2020-08-23

## 2020-08-23 LAB
ANION GAP SERPL CALC-SCNC: 9 MMOL/L — SIGNIFICANT CHANGE UP (ref 5–17)
BUN SERPL-MCNC: 36 MG/DL — HIGH (ref 7–23)
CALCIUM SERPL-MCNC: 8.9 MG/DL — SIGNIFICANT CHANGE UP (ref 8.4–10.5)
CHLORIDE SERPL-SCNC: 111 MMOL/L — HIGH (ref 96–108)
CO2 SERPL-SCNC: 22 MMOL/L — SIGNIFICANT CHANGE UP (ref 22–31)
CREAT SERPL-MCNC: 1.32 MG/DL — HIGH (ref 0.5–1.3)
GLUCOSE SERPL-MCNC: 112 MG/DL — HIGH (ref 70–99)
HCT VFR BLD CALC: 23.6 % — LOW (ref 39–50)
HGB BLD-MCNC: 7.5 G/DL — LOW (ref 13–17)
MAGNESIUM SERPL-MCNC: 1.7 MG/DL — SIGNIFICANT CHANGE UP (ref 1.6–2.6)
MCHC RBC-ENTMCNC: 28.7 PG — SIGNIFICANT CHANGE UP (ref 27–34)
MCHC RBC-ENTMCNC: 31.8 GM/DL — LOW (ref 32–36)
MCV RBC AUTO: 90.4 FL — SIGNIFICANT CHANGE UP (ref 80–100)
NRBC # BLD: 0 /100 WBCS — SIGNIFICANT CHANGE UP (ref 0–0)
PHOSPHATE SERPL-MCNC: 1.7 MG/DL — LOW (ref 2.5–4.5)
PLATELET # BLD AUTO: 137 K/UL — LOW (ref 150–400)
POTASSIUM SERPL-MCNC: 3.6 MMOL/L — SIGNIFICANT CHANGE UP (ref 3.5–5.3)
POTASSIUM SERPL-SCNC: 3.6 MMOL/L — SIGNIFICANT CHANGE UP (ref 3.5–5.3)
RBC # BLD: 2.61 M/UL — LOW (ref 4.2–5.8)
RBC # FLD: 13.9 % — SIGNIFICANT CHANGE UP (ref 10.3–14.5)
SODIUM SERPL-SCNC: 142 MMOL/L — SIGNIFICANT CHANGE UP (ref 135–145)
SURGICAL PATHOLOGY STUDY: SIGNIFICANT CHANGE UP
SURGICAL PATHOLOGY STUDY: SIGNIFICANT CHANGE UP
WBC # BLD: 7.82 K/UL — SIGNIFICANT CHANGE UP (ref 3.8–10.5)
WBC # FLD AUTO: 7.82 K/UL — SIGNIFICANT CHANGE UP (ref 3.8–10.5)

## 2020-08-23 RX ORDER — LISINOPRIL 2.5 MG/1
1 TABLET ORAL
Qty: 0 | Refills: 0 | DISCHARGE

## 2020-08-23 RX ORDER — ATORVASTATIN CALCIUM 80 MG/1
1 TABLET, FILM COATED ORAL
Qty: 0 | Refills: 0 | DISCHARGE

## 2020-08-23 RX ORDER — EZETIMIBE 10 MG/1
1 TABLET ORAL
Qty: 0 | Refills: 0 | DISCHARGE

## 2020-08-23 RX ORDER — POTASSIUM PHOSPHATE, MONOBASIC POTASSIUM PHOSPHATE, DIBASIC 236; 224 MG/ML; MG/ML
21 INJECTION, SOLUTION INTRAVENOUS ONCE
Refills: 0 | Status: COMPLETED | OUTPATIENT
Start: 2020-08-23 | End: 2020-08-23

## 2020-08-23 RX ORDER — APIXABAN 2.5 MG/1
2.5 TABLET, FILM COATED ORAL
Refills: 0 | Status: DISCONTINUED | OUTPATIENT
Start: 2020-08-23 | End: 2020-08-29

## 2020-08-23 RX ORDER — APIXABAN 2.5 MG/1
1 TABLET, FILM COATED ORAL
Qty: 0 | Refills: 0 | DISCHARGE

## 2020-08-23 RX ADMIN — Medication 500 MILLIGRAM(S): at 13:47

## 2020-08-23 RX ADMIN — POTASSIUM PHOSPHATE, MONOBASIC POTASSIUM PHOSPHATE, DIBASIC 62.5 MILLIMOLE(S): 236; 224 INJECTION, SOLUTION INTRAVENOUS at 11:29

## 2020-08-23 RX ADMIN — ATORVASTATIN CALCIUM 80 MILLIGRAM(S): 80 TABLET, FILM COATED ORAL at 11:29

## 2020-08-23 RX ADMIN — TAMSULOSIN HYDROCHLORIDE 0.4 MILLIGRAM(S): 0.4 CAPSULE ORAL at 22:00

## 2020-08-23 RX ADMIN — Medication 5 MILLIGRAM(S): at 22:00

## 2020-08-23 RX ADMIN — Medication 500 MILLIGRAM(S): at 22:00

## 2020-08-23 RX ADMIN — APIXABAN 2.5 MILLIGRAM(S): 2.5 TABLET, FILM COATED ORAL at 17:50

## 2020-08-23 RX ADMIN — Medication 500 MILLIGRAM(S): at 05:42

## 2020-08-23 NOTE — PROGRESS NOTE ADULT - SUBJECTIVE AND OBJECTIVE BOX
POST-OP DAY: 6 s/p open R inguinal hernia repair, umbilical hernia repair, RTOR exlap, evacuation of abdominal wall hematoma requiring 2 units PRBCs and 1 FFP      SUBJECTIVE: Patient seen and examined bedside by chief resident. Patient denies abdominal pain and endorses desire to get out of bed. Denies further diarrhea, confirmed by nursing (rectal tube).     metroNIDAZOLE    Tablet 500 milliGRAM(s) Oral three times a day  tamsulosin 0.4 milliGRAM(s) Oral at bedtime    MEDICATIONS  (PRN):  acetaminophen 300 mG/codeine 30 mG 1 Tablet(s) Oral every 4 hours PRN Severe Pain (7 - 10)  HYDROmorphone  Injectable 0.5 milliGRAM(s) IV Push every 6 hours PRN Moderate Pain (4 - 6)  ondansetron Injectable 4 milliGRAM(s) IV Push every 8 hours PRN Nausea    I&O's Detail  OUT  Drain: 50 mL  Incontinent per Condom Catheter: 1360 mL    Vital Signs Last 24 Hrs  T(C): 37.2 (23 Aug 2020 12:41), Max: 37.2 (23 Aug 2020 12:41)  T(F): 98.9 (23 Aug 2020 12:41), Max: 98.9 (23 Aug 2020 12:41)  HR: 64 (23 Aug 2020 12:41) (61 - 78)  BP: 117/66 (23 Aug 2020 12:41) (112/76 - 146/79)  BP(mean): 91 (22 Aug 2020 20:11) (91 - 103)  RR: 18 (23 Aug 2020 12:41) (17 - 18)  SpO2: 95% (23 Aug 2020 12:41) (95% - 97%)    General: NAD, resting comfortably in bed  C/V: pulses present and strong in b/l upper extremities   Pulm: Nonlabored breathing, no respiratory distress  Abd: soft, moderately distended, NT, R SILVIA with dark hematoma clot  Extrem: WWP, no edema, R hand brace, SCDs in place     LABS:             7.5    7.82  )-----------( 137      ( 23 Aug 2020 08:04 )             23.6     08-23    142  |  111<H>  |  36<H>  ----------------------------<  112<H>  3.6   |  22  |  1.32<H>    Ca    8.9      23 Aug 2020 08:04  Phos  1.7     08-23  Mg     1.7     08-23

## 2020-08-23 NOTE — PROGRESS NOTE ADULT - ASSESSMENT
85M with pmhx of CKD (Cr 1.59 pre-op), hx of CVA on eliquis (held starting 8/15), CAD, AFib, HTN, iliac artery anuerysm, now s/p elective procedure for repair of large right inguinal hernia on 8/17. On the floors, patient found to be hypotensive with a Hgb of 4.5. Taken urgently to the OR for ex lap and hematoma evacuation requiring 2x PRBCs and 1x FFP. Hemodynamically stable, brandt regular diet, improved cognitive function.     Diet: regular  Discontinue SILVIA drain  Pain/Nausea   Condom cath, c/w flomax   Discontinue rectal tube  Abx: Metronidazole, Discontinue Keflex  IS, SCDs   Reconsult PT to eval for home with home PT vs [facility]   Restart Eliquis       Plan discussed with attending and chief resident.   ____________________________________________________  Lary Cross MD     PGY1 - Surgery Team 1

## 2020-08-23 NOTE — DISCHARGE NOTE PROVIDER - NSDCCPTREATMENT_GEN_ALL_CORE_FT
PRINCIPAL PROCEDURE  Procedure: Repair, inguinal hernia, sliding  Findings and Treatment: R, open      SECONDARY PROCEDURE  Procedure: Appendectomy  Findings and Treatment:     Procedure: Repair, hernia, umbilical, adult  Findings and Treatment:     Procedure: Evacuation of hematoma of abdomen  Findings and Treatment:

## 2020-08-23 NOTE — DISCHARGE NOTE PROVIDER - NSDCFUADDAPPT_GEN_ALL_CORE_FT
Follow up with Dr. Gomez in 1 week. Call the office at [phone] to schedule your appointment. Follow up with Dr. Gomez in 1 week. Call the office at [phone] to schedule your appointment.    Please call Dr. Victor's (Orthopaedics) office to schedule an appointment within 1 week after discharge [phone].

## 2020-08-23 NOTE — DISCHARGE NOTE PROVIDER - CARE PROVIDER_API CALL
Vinicio Gomez A  SURGERY  [address] [id number]  Phone: [phone]  Fax: [phone]  Follow Up Time: Vinicio Gomez  SURGERY  [address] [address]  Phone: [phone]  Fax: [phone]  Follow Up Time:     David Victor  ORTHOPAEDIC SURGERY  [address], [address] [place] [address]  Phone: [phone]  Fax: [phone]  Follow Up Time:

## 2020-08-23 NOTE — DISCHARGE NOTE PROVIDER - NSDCMRMEDTOKEN_GEN_ALL_CORE_FT
atorvastatin 80 mg oral tablet: 1 tab(s) orally once a day  Eliquis 2.5 mg oral tablet: 1 tab(s) orally 2 times a day  ezetimibe 10 mg oral tablet: 1 tab(s) orally once a day  lisinopril 2.5 mg oral tablet: 1 tab(s) orally once a day

## 2020-08-23 NOTE — DISCHARGE NOTE PROVIDER - HOSPITAL COURSE
Pt is an 86 y/o M with pmhx of CKD, hx of CVA on eliquis at home which was held starting 8/15, CAD, AFib, HTN, iliac artery anuerysm, presented on 8/17 for elective open right inguinal hernia repair. Roughly 5 hours post-op, patient became hypotensive to SBP 60s on multple cuff reads. Patient appeared somnolent and pale. Abdominal exam showed asymmetric swelling in the R abdomnial wall, soft to touch. STAT CBC showed a Hgb of 4.5. 2u pRBC ordered and started immediately. Chief resident assessed patient at bedside who then called Dr. Gomez and the patient was taken urgently to the OR for an ex lap and hematoma evacuation. Transferred to SICU post op for close hemodynamic monitoring. On POD1, pt required pressors in the morning, he was weaned off throughout the day. That night, pt noted to be delirious, thought to be ICU delirium. POD 2, pt was transferred down to tele. Pt is an 86 y/o M with pmhx of CKD, hx of CVA on eliquis at home which was held starting 8/15, CAD, AFib, HTN, iliac artery anuerysm, presented on 8/17 for elective open right inguinal hernia repair. Roughly 5 hours post-op, patient became hypotensive to SBP 60s on multple cuff reads. Patient appeared somnolent and pale. Abdominal exam showed asymmetric swelling in the R abdomnial wall, soft to touch. STAT CBC showed a Hgb of 4.5. 2u pRBC ordered and started immediately. Chief resident assessed patient at bedside who then called Dr. Gomez and the patient was taken urgently to the OR for an ex lap and hematoma evacuation. Transferred to SICU post op for close hemodynamic monitoring. On POD1, pt required pressors in the morning, he was weaned off throughout the day. That night, pt noted to be delirious, thought to be ICU delirium. POD 2, pt was transferred down to [facility], his diet was advanced to CLD with tolerance. POD 3, pt continued to have delirium, also developed diarrhea, C. Diff negative, was noted to be hypotensive (80s/50s) with stable Hgb, thought to be 2/2 to dehydration, responded to fluids. At  1:30pm that day patient w/ unwitnessed fall while walking to bathroom, resulting in R thumb dislocation which was reduced by orthopedics. Pt noted to be AOx4 without neurologic deficits on exam. Fall work up with negative with CT head showing chronic changes from previous CVA but negative for acute injury, EKG normal, troponins normal. Fall was attributed to deconditioning. Enhanced monitoring was ordered for close observation of pt. Pt's mental status remained below his baseline according to daughter, on POD 4 pt had multiple loose BMs requiring rectal tube placement due to skin damaging concerns. He was started on empiric PO flagyl with improvement in diarrhea over the next couple days. He was evaluated by PT that day and they recommended [facility]. Neuro consulted and recommended vEEG that night, refused by patient.                                        8/25: PT recs [facility], will try and see today. Fam wants home w/ PT, might hire someone    O/N: brandt dinner, good urine o/p, VSS     8/24: tolerating PO, PT will stop by. Neuro: NTD.    O/N: pt brandt diet, good urine o/p, + BM, VSS     8/23: pulled SILVIA, restarted eliquis, brandt. regular diet, discontinued keflex, pulled rectal tube     O/N: ZAFAR. AfVSS. peeing in condom cath    8/22: Improved mental status, conversant, AOx3. Tolerating regular diet well. Calorie counting. Held vEEG given improved status. Stepped down to regional Pt is an 84 y/o M with pmhx of CKD, hx of CVA on eliquis at home which was held starting 8/15, CAD, AFib, HTN, iliac artery anuerysm, presented on 8/17 for elective open right inguinal hernia repair. Roughly 5 hours post-op, patient became hypotensive to SBP 60s on multple cuff reads. Patient appeared somnolent and pale. Abdominal exam showed asymmetric swelling in the R abdomnial wall, soft to touch. STAT CBC showed a Hgb of 4.5. 2u pRBC ordered and started immediately. Chief resident assessed patient at bedside who then called Dr. Gomez and the patient was taken urgently to the OR for an ex lap and hematoma evacuation. Transferred to SICU post op for close hemodynamic monitoring. On POD1, pt required pressors in the morning, he was weaned off throughout the day. That night, pt noted to be delirious, thought to be ICU delirium. POD 2, pt was transferred down to [facility], his diet was advanced to CLD with tolerance. POD 3, pt continued to have delirium, also developed diarrhea, C. Diff negative, was noted to be hypotensive (80s/50s) with stable Hgb, thought to be 2/2 to dehydration, responded to fluids. At  1:30pm that day patient w/ unwitnessed fall while walking to bathroom, resulting in R thumb dislocation which was reduced by orthopedics. Pt noted to be AOx4 without neurologic deficits on exam. Fall work up with negative with CT head showing chronic changes from previous CVA but negative for acute injury, EKG normal, troponins normal. Fall was attributed to deconditioning. Enhanced monitoring was ordered for close observation of pt. Pt's mental status remained below his baseline according to daughter, on POD 4 pt had multiple loose BMs requiring rectal tube placement due to skin damaging concerns. He was started on empiric PO flagyl with improvement in diarrhea over the next couple days. He was evaluated by PT that day and they recommended [facility]. Neuro consulted and recommended vEEG that night, refused by patient. On 8/22 the patient had improved mental status A&Ox3, tolerating regular diet. On 8/23 the patients SILVIA was removed and his home eliquis was restarted and rectal tube was removed. PT evaluated the patient and recommended discharged to [facility]. The patients family is refusing discharge to  [facility] and would rather discharge to home with home services including nursing, private hire care, and PT. Pt is an 86 y/o M with pmhx of CKD, hx of CVA on eliquis at home which was held starting 8/15, CAD, AFib, HTN, iliac artery anuerysm, presented on 8/17 for elective open right inguinal hernia repair. Roughly 5 hours post-op, patient became hypotensive to SBP 60s on multple cuff reads. Patient appeared somnolent and pale. Abdominal exam showed asymmetric swelling in the R abdomnial wall, soft to touch. STAT CBC showed a Hgb of 4.5. 2u pRBC ordered and started immediately. Chief resident assessed patient at bedside who then called Dr. Gomez and the patient was taken urgently to the OR for an ex lap and hematoma evacuation. Transferred to SICU post op for close hemodynamic monitoring. On POD1, pt required pressors in the morning, he was weaned off throughout the day. That night, pt noted to be delirious, thought to be ICU delirium. POD 2, pt was transferred down to [facility], his diet was advanced to CLD with tolerance. POD 3, pt continued to have delirium, also developed diarrhea, C. Diff negative, was noted to be hypotensive (80s/50s) with stable Hgb, thought to be 2/2 to dehydration, responded to fluids. At  1:30pm that day patient w/ unwitnessed fall while walking to bathroom, resulting in R thumb dislocation which was reduced by orthopedics. Pt noted to be AOx4 without neurologic deficits on exam. Fall work up with negative with CT head showing chronic changes from previous CVA but negative for acute injury, EKG normal, troponins normal. Fall was attributed to deconditioning. Enhanced monitoring was ordered for close observation of pt. Pt's mental status remained below his baseline according to daughter, on POD 4 pt had multiple loose BMs requiring rectal tube placement due to skin damaging concerns. He was started on empiric PO flagyl with improvement in diarrhea over the next couple days. He was evaluated by PT that day and they recommended [facility]. Neuro consulted and recommended vEEG that night, refused by patient. On 8/22 the patient had improved mental status A&Ox3, tolerating regular diet. On 8/23 the patients SILVIA was removed and his home eliquis was restarted and rectal tube was removed. PT evaluated the patient and recommended discharged to [facility]. The patients family is refusing discharge to [facility] and would rather discharge to home with home services including nursing, private hire care, and PT. An extensive discussion was had with the family about pt's home physical therapy/nursing needs, family understood that our recommendations were [facility] but they decided for home with home PT, private hire care and PT. Per orthopedics, pt does not have any weight bearing restrictions, they recommended that pt follow up 1 with Dr. Victor one week after discharge, Pt is an 84 y/o M with pmhx of CKD, hx of CVA on eliquis at home which was held starting 8/15, CAD, AFib, HTN, iliac artery anuerysm, presented on 8/17 for elective open right inguinal hernia repair. Roughly 5 hours post-op, patient became hypotensive to SBP 60s on multple cuff reads. Patient appeared somnolent and pale. Abdominal exam showed asymmetric swelling in the R abdomnial wall, soft to touch. STAT CBC showed a Hgb of 4.5. 2u pRBC ordered and started immediately. Chief resident assessed patient at bedside who then called Dr. Gomez and the patient was taken urgently to the OR for an ex lap and hematoma evacuation. Transferred to SICU post op for close hemodynamic monitoring. On POD1, pt required pressors in the morning, he was weaned off throughout the day. That night, pt noted to be delirious, thought to be ICU delirium. POD 2, pt was transferred down to [facility], his diet was advanced to CLD with tolerance. POD 3, pt continued to have delirium, also developed diarrhea, C. Diff negative, was noted to be hypotensive (80s/50s) with stable Hgb, thought to be 2/2 to dehydration, responded to fluids. At  1:30pm that day patient w/ unwitnessed fall while walking to bathroom, resulting in R thumb dislocation which was reduced by orthopedics. Pt noted to be AOx4 without neurologic deficits on exam. Fall work up with negative with CT head showing chronic changes from previous CVA but negative for acute injury, EKG normal, troponins normal. Fall was attributed to deconditioning. Enhanced monitoring was ordered for close observation of pt. Pt's mental status remained below his baseline according to daughter, on POD 4 pt had multiple loose BMs requiring rectal tube placement due to skin damaging concerns. He was started on empiric PO flagyl with improvement in diarrhea over the next couple days. He was evaluated by PT that day and they recommended [facility]. Neuro consulted and recommended vEEG that night, refused by patient. On 8/22 the patient had improved mental status A&Ox3, tolerating regular diet. On 8/23 the patients SILVIA was removed and his home eliquis was restarted and rectal tube was removed. Neurology did not recommend any further work up at that time given that pt appeared to be at baseline per daughter. PT evaluated the patient and recommended discharged to [facility]. The patients family refused discharge to [facility] and would rather discharge to home with home services including nursing, private hire care, and PT. On 8/27, an extensive discussion was had with the family about pt's home physical therapy/nursing needs, family understood that our recommendations were [facility] but they decided for home with home PT, private hire care and home nursing services. Per orthopedics, pt did not  have any weight bearing restrictions, they recommended that pt follow up 1 with Dr. Victor one week after discharge. On 8/28, a multidisciplinary meeting was held with the family, social work, PT, and medical team was present.  had a discussion with the patient afterward. It was again discussed that our recommendation was [facility]. Wife acknowledged our recommendation but decided to proceed with discharge home with home services, PT, and private hire help. Physical therapy had a discussion about what home supplies would be provided and what supplies would need to be purchased by family. Throughout his stay, pt was seen multiple times by PT for strength training. Pt's conditioning improved and he was able to walk with two person assist. He was deemed capable of going home with close monitoring, nursing/PT help. Family was in contact with [facility] for home nursing needs. They stated that had hired private help for home PT. Pt was discharged with plan to follow up with Dr. Gomez and Dr. Victor.

## 2020-08-23 NOTE — DISCHARGE NOTE PROVIDER - NSDCFUADDINST_GEN_ALL_CORE_FT
You may shower; soap and water over incision sites. Do not scrub. Pat dry when done. No tub bathing or swimming until cleared. Keep incision sites out of the sun scars will darken. No heavy lifting (>10lbs) or strenuous exercise. You should be urinating at least 3-4x per day. Call the office if you experience increasing abdominal pain, nausea, vomiting, or temperature >100.4F. You may shower; soap and water over incision sites. Do not scrub. Pat dry when done. No tub bathing or swimming until cleared. Keep incision sites out of the sun scars will darken. No heavy lifting (>10lbs) or strenuous exercise. You should be urinating at least 3-4x per day. Call the office if you experience increasing abdominal pain, nausea, vomiting, or temperature >100.4F.    Please continue to work on strengthening with physical therapy.  Please follow up with outpatient neurologist for evaluation.  Please follow up with orthopaedics at the number provided.  Please follow up with Dr. Gomez within one week of discharge.    Please contact if there are any questions/concerns. Please continue your home medications.  You may shower; soap and water over incision sites. Do not scrub. Pat dry when done. No tub bathing or swimming until cleared. Keep incision sites out of the sun scars will darken. No heavy lifting (>10lbs) or strenuous exercise. You should be urinating at least 3-4x per day. Call the office if you experience increasing abdominal pain, nausea, vomiting, or temperature >100.4F.    Please continue to work on strengthening with physical therapy.  Please follow up with outpatient neurologist for evaluation.  Please follow up with orthopaedics at the number provided.  Please follow up with Dr. Gomez within one week of discharge.    Please contact if there are any questions/concerns.

## 2020-08-23 NOTE — DISCHARGE NOTE PROVIDER - NSDCCPCAREPLAN_GEN_ALL_CORE_FT
PRINCIPAL DISCHARGE DIAGNOSIS  Diagnosis: S/P inguinal hernia repair  Assessment and Plan of Treatment: Open, R      SECONDARY DISCHARGE DIAGNOSES  Diagnosis: Hematoma  Assessment and Plan of Treatment: PRINCIPAL DISCHARGE DIAGNOSIS  Diagnosis: S/P inguinal hernia repair  Assessment and Plan of Treatment:       SECONDARY DISCHARGE DIAGNOSES  Diagnosis: Senile dementia, delirium  Assessment and Plan of Treatment: Per family, pt with baseline memory issues and was going to evaluated by neurology later this year for cognitive testing. He was noted to be delirious post surgically. Neurology was consutled and recommended vEEG which the pt refused. Throughout his stay, delirium improved and pt was noted to be at baseline per daughter. Neurology did not recommend any further testing at this time. His delirium was thought to be related to age, surgery, and metabolic factors.    Diagnosis: Physical deconditioning  Assessment and Plan of Treatment: Pt was noted to be physically deconditioned after procedure. He experience a fall for which he underwent a full work up. He was seen by PT multiple times a week to evaluate and work on strengthening. Pt also underwent a calorie count and diet was advanced as tolerated. His strenght improved throughout his stay. PT and medical team recommended [facility]. family refused and requested home PT and home nursing help, plus stated that they would hire privately for help.    Diagnosis: Closed dislocation of right thumb  Assessment and Plan of Treatment: Pt with confirmed dislocated thumb on Xray after fall. Evaluated by ortho and reduced. Per ortho, pt does not have any weight bearing restrictions. Will f/u as an outpatient with Dr. Victor once he is discharged.    Diagnosis: Hematoma  Assessment and Plan of Treatment:

## 2020-08-24 LAB
ANION GAP SERPL CALC-SCNC: 7 MMOL/L — SIGNIFICANT CHANGE UP (ref 5–17)
BUN SERPL-MCNC: 37 MG/DL — HIGH (ref 7–23)
CALCIUM SERPL-MCNC: 9 MG/DL — SIGNIFICANT CHANGE UP (ref 8.4–10.5)
CHLORIDE SERPL-SCNC: 109 MMOL/L — HIGH (ref 96–108)
CO2 SERPL-SCNC: 23 MMOL/L — SIGNIFICANT CHANGE UP (ref 22–31)
CREAT SERPL-MCNC: 1.31 MG/DL — HIGH (ref 0.5–1.3)
GLUCOSE SERPL-MCNC: 104 MG/DL — HIGH (ref 70–99)
HCT VFR BLD CALC: 24 % — LOW (ref 39–50)
HGB BLD-MCNC: 7.6 G/DL — LOW (ref 13–17)
MAGNESIUM SERPL-MCNC: 1.6 MG/DL — SIGNIFICANT CHANGE UP (ref 1.6–2.6)
MCHC RBC-ENTMCNC: 28.1 PG — SIGNIFICANT CHANGE UP (ref 27–34)
MCHC RBC-ENTMCNC: 31.7 GM/DL — LOW (ref 32–36)
MCV RBC AUTO: 88.9 FL — SIGNIFICANT CHANGE UP (ref 80–100)
NRBC # BLD: 0 /100 WBCS — SIGNIFICANT CHANGE UP (ref 0–0)
PHOSPHATE SERPL-MCNC: 1.9 MG/DL — LOW (ref 2.5–4.5)
PLATELET # BLD AUTO: 149 K/UL — LOW (ref 150–400)
POTASSIUM SERPL-MCNC: 3.7 MMOL/L — SIGNIFICANT CHANGE UP (ref 3.5–5.3)
POTASSIUM SERPL-SCNC: 3.7 MMOL/L — SIGNIFICANT CHANGE UP (ref 3.5–5.3)
RBC # BLD: 2.7 M/UL — LOW (ref 4.2–5.8)
RBC # FLD: 13.7 % — SIGNIFICANT CHANGE UP (ref 10.3–14.5)
SODIUM SERPL-SCNC: 139 MMOL/L — SIGNIFICANT CHANGE UP (ref 135–145)
WBC # BLD: 8.39 K/UL — SIGNIFICANT CHANGE UP (ref 3.8–10.5)
WBC # FLD AUTO: 8.39 K/UL — SIGNIFICANT CHANGE UP (ref 3.8–10.5)

## 2020-08-24 PROCEDURE — 99232 SBSQ HOSP IP/OBS MODERATE 35: CPT

## 2020-08-24 RX ORDER — POTASSIUM PHOSPHATE, MONOBASIC POTASSIUM PHOSPHATE, DIBASIC 236; 224 MG/ML; MG/ML
21 INJECTION, SOLUTION INTRAVENOUS ONCE
Refills: 0 | Status: DISCONTINUED | OUTPATIENT
Start: 2020-08-24 | End: 2020-08-24

## 2020-08-24 RX ORDER — POTASSIUM PHOSPHATE, MONOBASIC POTASSIUM PHOSPHATE, DIBASIC 236; 224 MG/ML; MG/ML
21 INJECTION, SOLUTION INTRAVENOUS ONCE
Refills: 0 | Status: COMPLETED | OUTPATIENT
Start: 2020-08-24 | End: 2020-08-24

## 2020-08-24 RX ORDER — MAGNESIUM SULFATE 500 MG/ML
2 VIAL (ML) INJECTION EVERY 6 HOURS
Refills: 0 | Status: COMPLETED | OUTPATIENT
Start: 2020-08-24 | End: 2020-08-24

## 2020-08-24 RX ADMIN — Medication 50 GRAM(S): at 11:35

## 2020-08-24 RX ADMIN — Medication 500 MILLIGRAM(S): at 21:58

## 2020-08-24 RX ADMIN — TAMSULOSIN HYDROCHLORIDE 0.4 MILLIGRAM(S): 0.4 CAPSULE ORAL at 21:58

## 2020-08-24 RX ADMIN — Medication 500 MILLIGRAM(S): at 15:32

## 2020-08-24 RX ADMIN — Medication 50 GRAM(S): at 18:51

## 2020-08-24 RX ADMIN — APIXABAN 2.5 MILLIGRAM(S): 2.5 TABLET, FILM COATED ORAL at 05:51

## 2020-08-24 RX ADMIN — APIXABAN 2.5 MILLIGRAM(S): 2.5 TABLET, FILM COATED ORAL at 18:51

## 2020-08-24 RX ADMIN — ATORVASTATIN CALCIUM 80 MILLIGRAM(S): 80 TABLET, FILM COATED ORAL at 11:35

## 2020-08-24 RX ADMIN — POTASSIUM PHOSPHATE, MONOBASIC POTASSIUM PHOSPHATE, DIBASIC 62.5 MILLIMOLE(S): 236; 224 INJECTION, SOLUTION INTRAVENOUS at 13:01

## 2020-08-24 RX ADMIN — Medication 5 MILLIGRAM(S): at 21:58

## 2020-08-24 RX ADMIN — Medication 500 MILLIGRAM(S): at 05:51

## 2020-08-24 NOTE — PROGRESS NOTE ADULT - ASSESSMENT
85M with pmhx of CKD (Cr 1.59 pre-op), hx of CVA on eliquis (held starting 8/15), CAD, AFib, HTN, iliac artery anuerysm, POD7 s/p open R inguinal hernia repair, umbilical hernia repair, RTOR exlap, evacuation of abdominal wall hematoma requiring 2 units PRBCs and 1 FFP. Pt doing well, tolerating regular diet, answering questions appropriately. Will need to work on strengthening with PT.    Diet: regular  Pain/Nausea   Condom cath, c/w flomax   Metronidazole  IS, SCDs   PT for strengthening to eval for home with home PT vs [facility]   Home eliquis

## 2020-08-24 NOTE — PROGRESS NOTE ADULT - SUBJECTIVE AND OBJECTIVE BOX
STATUS POST:  POD7 s/p open R inguinal hernia repair, umbilical hernia repair, RTOR exlap, evacuation of abdominal wall hematoma requiring 2 units PRBCs and 1 FFP      SUBJECTIVE: ON: appeared to be sundowning, was re-oriented. Having BMs. This AM, patient seen and examined bedside by chief resident. Reports mild abdominal pain.     apixaban 2.5 milliGRAM(s) Oral two times a day  metroNIDAZOLE    Tablet 500 milliGRAM(s) Oral three times a day  tamsulosin 0.4 milliGRAM(s) Oral at bedtime      Vital Signs Last 24 Hrs  T(C): 37.1 (24 Aug 2020 05:05), Max: 37.2 (23 Aug 2020 12:41)  T(F): 98.7 (24 Aug 2020 05:05), Max: 98.9 (23 Aug 2020 12:41)  HR: 63 (24 Aug 2020 05:05) (63 - 78)  BP: 127/71 (24 Aug 2020 05:05) (107/69 - 127/71)  BP(mean): 89 (24 Aug 2020 05:05) (89 - 89)  RR: 18 (24 Aug 2020 05:05) (17 - 18)  SpO2: 96% (24 Aug 2020 05:05) (95% - 96%)  I&O's Detail    23 Aug 2020 07:01  -  24 Aug 2020 07:00  --------------------------------------------------------  IN:    Oral Fluid: 700 mL  Total IN: 700 mL    OUT:    Incontinent per Condom Catheter: 2000 mL  Total OUT: 2000 mL    Total NET: -1300 mL          Physical Exam:  General: No acute distress, resting comfortably in bed  Neuro: answering questions appropriately  Pulm: Nonlabored breathing, no respiratory distress, on RA  Abd: soft, non-tender, non-distended      LABS:                        7.6    8.39  )-----------( 149      ( 24 Aug 2020 05:51 )             24.0     08-24    139  |  109<H>  |  37<H>  ----------------------------<  104<H>  3.7   |  23  |  1.31<H>    Ca    9.0      24 Aug 2020 05:51  Phos  1.9     08-24  Mg     1.6     08-24            RADIOLOGY & ADDITIONAL STUDIES:

## 2020-08-24 NOTE — PROGRESS NOTE ADULT - ATTENDING COMMENTS
bubba in the setting of probable mild cognitive impairment / mild dementia at baseline  no further workup  call back with further questions

## 2020-08-24 NOTE — PROGRESS NOTE ADULT - SUBJECTIVE AND OBJECTIVE BOX
NEUROLOGY CONSULT: PROGRESS NOTE    INTERVAL HISTORY:  Patient with improved mental status, back to baseline. No acute complaints or in any distress.     SUBJECTIVE:      MEDICATIONS:  acetaminophen 300 mG/codeine 30 mG 1 Tablet(s) Oral every 4 hours PRN  apixaban 2.5 milliGRAM(s) Oral two times a day  atorvastatin 80 milliGRAM(s) Oral daily  HYDROmorphone  Injectable 0.5 milliGRAM(s) IV Push every 6 hours PRN  magnesium sulfate  IVPB 2 Gram(s) IV Intermittent every 6 hours  melatonin 5 milliGRAM(s) Oral at bedtime  metroNIDAZOLE    Tablet 500 milliGRAM(s) Oral three times a day  ondansetron Injectable 4 milliGRAM(s) IV Push every 8 hours PRN  potassium phosphate IVPB 21 milliMole(s) IV Intermittent once  tamsulosin 0.4 milliGRAM(s) Oral at bedtime    VITAL SIGNS:  Vital Signs Last 24 Hrs  T(C): 36.8 (24 Aug 2020 08:05), Max: 37.2 (23 Aug 2020 12:41)  T(F): 98.2 (24 Aug 2020 08:05), Max: 98.9 (23 Aug 2020 12:41)  HR: 70 (24 Aug 2020 08:05) (63 - 70)  BP: 122/70 (24 Aug 2020 08:05) (107/69 - 127/71)  BP(mean): 89 (24 Aug 2020 05:05) (89 - 89)  RR: 18 (24 Aug 2020 08:05) (17 - 18)  SpO2: 97% (24 Aug 2020 08:05) (95% - 97%)    PHYSICAL EXAMINATION:  General: Comfortable  Neurologic:     -Mental Status: AAOx2. Slight cognitive slowing (baseline per daughter).      -Cranial Nerves:          II: Visual fields are full to confrontation.          III, IV, VI: EOMI without nystagmus. PERRL b/l          V:  Facial sensation V1-V3 equal and intact           VII: Face is symmetric with normal eye closure and smile          VIII: Hearing is bilaterally intact to finger rub          IX, X: Uvula is midline and soft palate rises symmetrically          XI: Head turning and shoulder shrug are intact.          XII: Tongue protrudes midline     -Motor: Normal bulk and tone. No involuntary movements (tremor, myoclonus, chorea, athetosis, dystonia)          No pronator drift. Rapid alternating movements intact and symmetric     -Sensation: Intact to light touch. Pain and temperature intact. Vibration sense intact.           No neglect or extinction on double simultaneous testing.     -Coordination: No dysmetria on finger-to-nose and heel-to-shin intact bilaterally, rapid alternating hand movements intact     -Reflexes: 2+ and symmetric at biceps, triceps, brachioradialis, patellar, ankles          Plantar reflexes downgoing bilaterally. Downgoing toes bilaterally      -Gait: not tested      LABS:                          7.6    8.39  )-----------( 149      ( 24 Aug 2020 05:51 )             24.0     08-24    139  |  109<H>  |  37<H>  ----------------------------<  104<H>  3.7   |  23  |  1.31<H>    Ca    9.0      24 Aug 2020 05:51  Phos  1.9     08-24  Mg     1.6     08-24            RADIOLOGY & ADDITIONAL STUDIES:  reviewed NEUROLOGY CONSULT: PROGRESS NOTE    INTERVAL HISTORY:  Patient with improved mental status, back to baseline per family. No acute complaints or in any distress.     MEDICATIONS:  acetaminophen 300 mG/codeine 30 mG 1 Tablet(s) Oral every 4 hours PRN  apixaban 2.5 milliGRAM(s) Oral two times a day  atorvastatin 80 milliGRAM(s) Oral daily  HYDROmorphone  Injectable 0.5 milliGRAM(s) IV Push every 6 hours PRN  magnesium sulfate  IVPB 2 Gram(s) IV Intermittent every 6 hours  melatonin 5 milliGRAM(s) Oral at bedtime  metroNIDAZOLE    Tablet 500 milliGRAM(s) Oral three times a day  ondansetron Injectable 4 milliGRAM(s) IV Push every 8 hours PRN  potassium phosphate IVPB 21 milliMole(s) IV Intermittent once  tamsulosin 0.4 milliGRAM(s) Oral at bedtime    VITAL SIGNS:  Vital Signs Last 24 Hrs  T(C): 36.8 (24 Aug 2020 08:05), Max: 37.2 (23 Aug 2020 12:41)  T(F): 98.2 (24 Aug 2020 08:05), Max: 98.9 (23 Aug 2020 12:41)  HR: 70 (24 Aug 2020 08:05) (63 - 70)  BP: 122/70 (24 Aug 2020 08:05) (107/69 - 127/71)  BP(mean): 89 (24 Aug 2020 05:05) (89 - 89)  RR: 18 (24 Aug 2020 08:05) (17 - 18)  SpO2: 97% (24 Aug 2020 08:05) (95% - 97%)    PHYSICAL EXAMINATION:  General: Comfortable  Neurologic:     -Mental Status: AAOx2 (year, person, hospital but not which one). Slight cognitive slowing (baseline per daughter).      -Cranial Nerves:          II: Visual fields are full to confrontation.          III, IV, VI: EOMI without nystagmus. PERRL b/l          V:  Facial sensation V1-V3 equal and intact           VII: Face is symmetric with normal eye closure and smile          VIII: Hearing is bilaterally intact to finger rub          IX, X: Uvula is midline and soft palate rises symmetrically          XI: Head turning and shoulder shrug are intact.          XII: Tongue protrudes midline     -Motor: Normal bulk and tone. No involuntary movements (tremor, myoclonus, chorea, athetosis, dystonia)          No pronator drift. Rapid alternating movements intact and symmetric     -Sensation: Intact to light touch. Pain and temperature intact. Vibration sense intact.           No neglect or extinction on double simultaneous testing.     -Coordination: No dysmetria on finger-to-nose and heel-to-shin intact bilaterally, rapid alternating hand movements intact     -Reflexes: 2+ and symmetric at biceps, triceps, brachioradialis, patellar, ankles          Plantar reflexes downgoing bilaterally. Downgoing toes bilaterally      -Gait: not tested      LABS:                          7.6    8.39  )-----------( 149      ( 24 Aug 2020 05:51 )             24.0     08-24    139  |  109<H>  |  37<H>  ----------------------------<  104<H>  3.7   |  23  |  1.31<H>    Ca    9.0      24 Aug 2020 05:51  Phos  1.9     08-24  Mg     1.6     08-24            RADIOLOGY & ADDITIONAL STUDIES:  reviewed

## 2020-08-24 NOTE — CHART NOTE - NSCHARTNOTEFT_GEN_A_CORE
Admitting Diagnosis:   Patient is a 85y old  Male who presents with a chief complaint of hernia repair, elective, open R (23 Aug 2020 13:40)    PAST MEDICAL & SURGICAL HISTORY:  Atrial fibrillation  Confused: at night  Aneurysm of abdominal vessel  Polycystic kidney  Hyperparathyroidism  CVA (cerebral vascular accident): 2019  H/O prostate cancer  CKD (chronic kidney disease), stage III  HLD (hyperlipidemia)  HTN (hypertension)  CAD (coronary artery disease)  Atherosclerosis of aorto-iliac bypass graft: stent  S/P AAA repair  AICD (automatic cardioverter/defibrillator) present: [employer]  H/O hernia repair    Current Nutrition Order: Regular diet    PO Intake: Good (%) [   ]  Fair (50-75%) [   ] Poor (<25%) [   ]- Unable to quantify PO intake. Per 1:1 sitter, pt tolerated food well but since he just started his shift, he is unaware of amounts eaten.     GI Issues:   WDL, abd distention noted  No n/v/d/c  No chewing or swallowing impairments noted    Pain:  No pain noted at this time    Skin Integrity:  Surgical incision, deric score 14  No edema present  No pressure ulcers noted    Labs:   08-24    139  |  109<H>  |  37<H>  ----------------------------<  104<H>  3.7   |  23  |  1.31<H>    Ca    9.0      24 Aug 2020 05:51  Phos  1.9     08-24  Mg     1.6     08-24    Medications:  MEDICATIONS  (STANDING):  apixaban 2.5 milliGRAM(s) Oral two times a day  atorvastatin 80 milliGRAM(s) Oral daily  magnesium sulfate  IVPB 2 Gram(s) IV Intermittent every 6 hours  melatonin 5 milliGRAM(s) Oral at bedtime  metroNIDAZOLE    Tablet 500 milliGRAM(s) Oral three times a day  tamsulosin 0.4 milliGRAM(s) Oral at bedtime    MEDICATIONS  (PRN):  acetaminophen 300 mG/codeine 30 mG 1 Tablet(s) Oral every 4 hours PRN Severe Pain (7 - 10)  HYDROmorphone  Injectable 0.5 milliGRAM(s) IV Push every 6 hours PRN Moderate Pain (4 - 6)  ondansetron Injectable 4 milliGRAM(s) IV Push every 8 hours PRN Nausea    Admitted Anthropometrics:  Height: 71" Weight: 166lbs, IBW 172lbs+/-10%, %IBW 97%, BMI 23.1 kg/m2    Weight:  8/17 166lbs    Weight Change: No new weights obtained this admission and no other hospital admissions/ recorded weights. Pt unable to provide UBW 2/2 AMS. Please cont to trend weights weekly.      Nutrition Focused Physical Exam: Completed [   ]  Unable to complete [   ]- Unremarkable.     Estimated energy needs:   ABW (75kg) used for calculations as pt between % of IBW. Needs adjusted for advanced age.   Kcal (20-25 kcal/kg): 1823-4677 ml   Protein (1.0-1.2 g/kg pro): 75-90 g pro  Fluids (30-35 ml/kg): 3247-7605 ml    Subjective:   85M with hx of CKD (Cr 1.59 pre-op), hx of CVA on eliquis at home which was held starting 8/15, CAD, AFib, HTN, iliac artery anuerysm, now s/p elective procedure for repair of large right inguinal hernia complicated by bleed with RTOR 8/17, S/p evac of hematoma 8/18 stepped down from SICU yesterday s/p 2 units PRBCs and 1 FFP. Pt appears delirious- per EMR mental status improves in the am and pt sundowns. Per wife, pt has baseline issues with memory. Calorie count initiated this weekend was lost- unable to complete calorie count at this time. On assessment, pt was laying in bed comfortably, appears lethargic/ some AMS. Pt placed on enhanced supervision with 1:1 sitter. Currently on regular diet tolerating PO well, no n/v/d/c. Unable to quantify amounts eaten at meals at sitter just started his shift. Plan for d/c to [facility]. Education deferred 2/2 current status. Please see nutrition recs below. RD to follow up per protocol.     Nutrition Diagnosis: Inadequate oral intake RT decreased willingness to eat likely 2/2 AMS and poor sleep AEB Pt consuming 0% of meals    [  ] No active nutrition diagnosis at this time  [  ] Current medical condition precludes nutrition intervention    Goal: Consistently meet >75% est needs    Recommendations:  1. Cont with regular diet  2. Cont to encourage adequate PO intake  3. Cont to maintain hydration status  4. Monitor lytes and replete prn    Education: Education deferred 2/2 lethargy/ AMS    Risk Level: High [ x  ] Moderate [   ] Low [   ].

## 2020-08-24 NOTE — PROGRESS NOTE ADULT - ASSESSMENT
85 year old male with CKD, CVA 2019, Afib on Eliquis, CAD, HTN, who was admitted on 8/17 for elective repair of large right inguinal repair c/b bleed with ROTR. Neurology consulted for altered mental status    Recommendations:  Etiology of altered mental status likely toxic metabolic and multifactorial. CTH (8/20) with no signs of structural reasons for AMS. Delirium likely from hospital stay and prolonged given co-morbidities. Slight cognitive slowing on exam but AOx3 and non focal.   -Patient back to baseline (per daughter), much improved from prior. No further neurological work up recommended at this time.    Neurology will signs off, please reach out if there are additional questions or concerns.     Discussed with attending  Juan C Saha PGY2

## 2020-08-25 LAB
ANION GAP SERPL CALC-SCNC: 9 MMOL/L — SIGNIFICANT CHANGE UP (ref 5–17)
BUN SERPL-MCNC: 31 MG/DL — HIGH (ref 7–23)
CALCIUM SERPL-MCNC: 9.1 MG/DL — SIGNIFICANT CHANGE UP (ref 8.4–10.5)
CHLORIDE SERPL-SCNC: 109 MMOL/L — HIGH (ref 96–108)
CO2 SERPL-SCNC: 22 MMOL/L — SIGNIFICANT CHANGE UP (ref 22–31)
CREAT SERPL-MCNC: 1.38 MG/DL — HIGH (ref 0.5–1.3)
GLUCOSE SERPL-MCNC: 113 MG/DL — HIGH (ref 70–99)
HCT VFR BLD CALC: 25.9 % — LOW (ref 39–50)
HGB BLD-MCNC: 8.4 G/DL — LOW (ref 13–17)
MAGNESIUM SERPL-MCNC: 2.2 MG/DL — SIGNIFICANT CHANGE UP (ref 1.6–2.6)
MCHC RBC-ENTMCNC: 29 PG — SIGNIFICANT CHANGE UP (ref 27–34)
MCHC RBC-ENTMCNC: 32.4 GM/DL — SIGNIFICANT CHANGE UP (ref 32–36)
MCV RBC AUTO: 89.3 FL — SIGNIFICANT CHANGE UP (ref 80–100)
NRBC # BLD: 0 /100 WBCS — SIGNIFICANT CHANGE UP (ref 0–0)
PHOSPHATE SERPL-MCNC: 2.3 MG/DL — LOW (ref 2.5–4.5)
PLATELET # BLD AUTO: 180 K/UL — SIGNIFICANT CHANGE UP (ref 150–400)
POTASSIUM SERPL-MCNC: 4.2 MMOL/L — SIGNIFICANT CHANGE UP (ref 3.5–5.3)
POTASSIUM SERPL-SCNC: 4.2 MMOL/L — SIGNIFICANT CHANGE UP (ref 3.5–5.3)
RBC # BLD: 2.9 M/UL — LOW (ref 4.2–5.8)
RBC # FLD: 13.6 % — SIGNIFICANT CHANGE UP (ref 10.3–14.5)
SODIUM SERPL-SCNC: 140 MMOL/L — SIGNIFICANT CHANGE UP (ref 135–145)
WBC # BLD: 9.66 K/UL — SIGNIFICANT CHANGE UP (ref 3.8–10.5)
WBC # FLD AUTO: 9.66 K/UL — SIGNIFICANT CHANGE UP (ref 3.8–10.5)

## 2020-08-25 RX ORDER — SODIUM,POTASSIUM PHOSPHATES 278-250MG
1 POWDER IN PACKET (EA) ORAL ONCE
Refills: 0 | Status: COMPLETED | OUTPATIENT
Start: 2020-08-25 | End: 2020-08-25

## 2020-08-25 RX ORDER — MAGNESIUM SULFATE 500 MG/ML
1 VIAL (ML) INJECTION ONCE
Refills: 0 | Status: COMPLETED | OUTPATIENT
Start: 2020-08-25 | End: 2020-08-25

## 2020-08-25 RX ADMIN — Medication 500 MILLIGRAM(S): at 13:27

## 2020-08-25 RX ADMIN — Medication 100 GRAM(S): at 07:15

## 2020-08-25 RX ADMIN — Medication 1 PACKET(S): at 07:30

## 2020-08-25 RX ADMIN — Medication 500 MILLIGRAM(S): at 21:18

## 2020-08-25 RX ADMIN — Medication 500 MILLIGRAM(S): at 05:49

## 2020-08-25 RX ADMIN — Medication 5 MILLIGRAM(S): at 21:18

## 2020-08-25 RX ADMIN — APIXABAN 2.5 MILLIGRAM(S): 2.5 TABLET, FILM COATED ORAL at 05:49

## 2020-08-25 RX ADMIN — ATORVASTATIN CALCIUM 80 MILLIGRAM(S): 80 TABLET, FILM COATED ORAL at 21:18

## 2020-08-25 RX ADMIN — TAMSULOSIN HYDROCHLORIDE 0.4 MILLIGRAM(S): 0.4 CAPSULE ORAL at 21:18

## 2020-08-25 RX ADMIN — APIXABAN 2.5 MILLIGRAM(S): 2.5 TABLET, FILM COATED ORAL at 17:54

## 2020-08-25 NOTE — PROGRESS NOTE ADULT - ASSESSMENT
85M with pmhx of CKD (Cr 1.59 pre-op), hx of CVA on eliquis (held starting 8/15), CAD, AFib, HTN, iliac artery anuerysm, POD7 s/p open R inguinal hernia repair, umbilical hernia repair, RTOR exlap, evacuation of abdominal wall hematoma requiring 2 units PRBCs and 1 FFP. Pt doing well, tolerating regular diet, answering questions appropriately. Will need to work on strengthening with PT.    Diet: regular  Pain/Nausea   Condom cath, c/w flomax   Metronidazole  IS, SCDs   PT for strengthening to eval for home with home PT vs [facility]   Home eliquis 85M with pmhx of CKD (Cr 1.59 pre-op), hx of CVA on eliquis (held starting 8/15), CAD, AFib, HTN, iliac artery anuerysm, POD8 s/p open R inguinal hernia repair, umbilical hernia repair, RTOR exlap, evacuation of abdominal wall hematoma requiring 2 units PRBCs and 1 FFP. Pt doing well, tolerating regular diet, answering questions appropriately. Will follow up with PT about recs.    Diet: regular  Pain/Nausea   Condom cath, c/w flomax   Metronidazole  IS, SCDs   f/u PT for strengthening and eval for home with home PT vs [facility]   Home eliquis

## 2020-08-25 NOTE — PROGRESS NOTE ADULT - SUBJECTIVE AND OBJECTIVE BOX
STATUS POST:   POD8 s/p open R inguinal hernia repair, umbilical hernia repair, RTOR exlap, evacuation of abdominal wall hematoma requiring 2 units PRBCs and 1 FF     SUBJECTIVE: O/N: brandt dinner, good urine o/p, VSS  Patient seen and examined bedside by chief resident.     apixaban 2.5 milliGRAM(s) Oral two times a day  metroNIDAZOLE    Tablet 500 milliGRAM(s) Oral three times a day  tamsulosin 0.4 milliGRAM(s) Oral at bedtime      Vital Signs Last 24 Hrs  T(C): 37 (25 Aug 2020 04:53), Max: 37.1 (24 Aug 2020 20:35)  T(F): 98.6 (25 Aug 2020 04:53), Max: 98.7 (24 Aug 2020 20:35)  HR: 70 (25 Aug 2020 04:53) (58 - 89)  BP: 131/78 (25 Aug 2020 04:53) (105/68 - 131/78)  BP(mean): 95 (24 Aug 2020 17:07) (95 - 95)  RR: 17 (25 Aug 2020 04:53) (17 - 18)  SpO2: 95% (25 Aug 2020 04:53) (95% - 98%)  I&O's Detail    23 Aug 2020 07:01  -  24 Aug 2020 07:00  --------------------------------------------------------  IN:    Oral Fluid: 700 mL  Total IN: 700 mL    OUT:    Incontinent per Condom Catheter: 2000 mL  Total OUT: 2000 mL    Total NET: -1300 mL      24 Aug 2020 07:01  -  25 Aug 2020 06:06  --------------------------------------------------------  IN:    Oral Fluid: 360 mL  Total IN: 360 mL    OUT:    Incontinent per Condom Catheter: 600 mL    Voided: 600 mL  Total OUT: 1200 mL    Total NET: -840 mL          Physical Exam:  General: No acute distress, resting comfortably in bed  C/V: normal sinus rhythm  Pulm: Nonlabored breathing, no respiratory distress  Abd: soft, non-tender, non-distended, incisions clean/dry/intact.  Extrem: warm and well perfused, no edema, SCDs in place    LABS:                        7.6    8.39  )-----------( 149      ( 24 Aug 2020 05:51 )             24.0     08-24    139  |  109<H>  |  37<H>  ----------------------------<  104<H>  3.7   |  23  |  1.31<H>    Ca    9.0      24 Aug 2020 05:51  Phos  1.9     08-24  Mg     1.6     08-24            RADIOLOGY & ADDITIONAL STUDIES: STATUS POST:   POD8 s/p open R inguinal hernia repair, umbilical hernia repair, RTOR exlap, evacuation of abdominal wall hematoma requiring 2 units PRBCs and 1 FF     SUBJECTIVE: O/N: brandt dinner, good urine o/p, VSS  Patient seen and examined bedside by chief resident. Denies abdominal pain this AM. Denies nausea, vomiting    apixaban 2.5 milliGRAM(s) Oral two times a day  metroNIDAZOLE    Tablet 500 milliGRAM(s) Oral three times a day  tamsulosin 0.4 milliGRAM(s) Oral at bedtime      Vital Signs Last 24 Hrs  T(C): 37 (25 Aug 2020 04:53), Max: 37.1 (24 Aug 2020 20:35)  T(F): 98.6 (25 Aug 2020 04:53), Max: 98.7 (24 Aug 2020 20:35)  HR: 70 (25 Aug 2020 04:53) (58 - 89)  BP: 131/78 (25 Aug 2020 04:53) (105/68 - 131/78)  BP(mean): 95 (24 Aug 2020 17:07) (95 - 95)  RR: 17 (25 Aug 2020 04:53) (17 - 18)  SpO2: 95% (25 Aug 2020 04:53) (95% - 98%)  I&O's Detail    23 Aug 2020 07:01  -  24 Aug 2020 07:00  --------------------------------------------------------  IN:    Oral Fluid: 700 mL  Total IN: 700 mL    OUT:    Incontinent per Condom Catheter: 2000 mL  Total OUT: 2000 mL    Total NET: -1300 mL      24 Aug 2020 07:01  -  25 Aug 2020 06:06  --------------------------------------------------------  IN:    Oral Fluid: 360 mL  Total IN: 360 mL    OUT:    Incontinent per Condom Catheter: 600 mL    Voided: 600 mL  Total OUT: 1200 mL    Total NET: -840 mL          Physical Exam:  General: No acute distress, resting comfortably in bed  Neuro: answering questions appropriately, in full sentences  Pulm: Nonlabored breathing, no respiratory distress, on RA  Abd: soft, non-tender, mildly-distended, incisions clean/dry/intact.  Extrem: SCDs in place    LABS:                        7.6    8.39  )-----------( 149      ( 24 Aug 2020 05:51 )             24.0     08-24    139  |  109<H>  |  37<H>  ----------------------------<  104<H>  3.7   |  23  |  1.31<H>    Ca    9.0      24 Aug 2020 05:51  Phos  1.9     08-24  Mg     1.6     08-24            RADIOLOGY & ADDITIONAL STUDIES: n/a

## 2020-08-26 LAB
ANION GAP SERPL CALC-SCNC: 8 MMOL/L — SIGNIFICANT CHANGE UP (ref 5–17)
BUN SERPL-MCNC: 33 MG/DL — HIGH (ref 7–23)
CALCIUM SERPL-MCNC: 9.3 MG/DL — SIGNIFICANT CHANGE UP (ref 8.4–10.5)
CHLORIDE SERPL-SCNC: 108 MMOL/L — SIGNIFICANT CHANGE UP (ref 96–108)
CO2 SERPL-SCNC: 21 MMOL/L — LOW (ref 22–31)
CREAT SERPL-MCNC: 1.35 MG/DL — HIGH (ref 0.5–1.3)
GLUCOSE SERPL-MCNC: 96 MG/DL — SIGNIFICANT CHANGE UP (ref 70–99)
HCT VFR BLD CALC: 28.6 % — LOW (ref 39–50)
HGB BLD-MCNC: 8.9 G/DL — LOW (ref 13–17)
MAGNESIUM SERPL-MCNC: 2.2 MG/DL — SIGNIFICANT CHANGE UP (ref 1.6–2.6)
MCHC RBC-ENTMCNC: 28.8 PG — SIGNIFICANT CHANGE UP (ref 27–34)
MCHC RBC-ENTMCNC: 31.1 GM/DL — LOW (ref 32–36)
MCV RBC AUTO: 92.6 FL — SIGNIFICANT CHANGE UP (ref 80–100)
NRBC # BLD: 0 /100 WBCS — SIGNIFICANT CHANGE UP (ref 0–0)
PHOSPHATE SERPL-MCNC: 2.2 MG/DL — LOW (ref 2.5–4.5)
PLATELET # BLD AUTO: 206 K/UL — SIGNIFICANT CHANGE UP (ref 150–400)
POTASSIUM SERPL-MCNC: 4.1 MMOL/L — SIGNIFICANT CHANGE UP (ref 3.5–5.3)
POTASSIUM SERPL-MCNC: SIGNIFICANT CHANGE UP MMOL/L (ref 3.5–5.3)
POTASSIUM SERPL-SCNC: 4.1 MMOL/L — SIGNIFICANT CHANGE UP (ref 3.5–5.3)
POTASSIUM SERPL-SCNC: SIGNIFICANT CHANGE UP MMOL/L (ref 3.5–5.3)
RBC # BLD: 3.09 M/UL — LOW (ref 4.2–5.8)
RBC # FLD: 13.8 % — SIGNIFICANT CHANGE UP (ref 10.3–14.5)
SODIUM SERPL-SCNC: 137 MMOL/L — SIGNIFICANT CHANGE UP (ref 135–145)
WBC # BLD: 9.25 K/UL — SIGNIFICANT CHANGE UP (ref 3.8–10.5)
WBC # FLD AUTO: 9.25 K/UL — SIGNIFICANT CHANGE UP (ref 3.8–10.5)

## 2020-08-26 RX ADMIN — ATORVASTATIN CALCIUM 80 MILLIGRAM(S): 80 TABLET, FILM COATED ORAL at 22:52

## 2020-08-26 RX ADMIN — TAMSULOSIN HYDROCHLORIDE 0.4 MILLIGRAM(S): 0.4 CAPSULE ORAL at 22:52

## 2020-08-26 RX ADMIN — Medication 5 MILLIGRAM(S): at 22:52

## 2020-08-26 RX ADMIN — Medication 500 MILLIGRAM(S): at 13:37

## 2020-08-26 RX ADMIN — APIXABAN 2.5 MILLIGRAM(S): 2.5 TABLET, FILM COATED ORAL at 05:12

## 2020-08-26 RX ADMIN — Medication 500 MILLIGRAM(S): at 05:12

## 2020-08-26 RX ADMIN — APIXABAN 2.5 MILLIGRAM(S): 2.5 TABLET, FILM COATED ORAL at 18:20

## 2020-08-26 NOTE — PROGRESS NOTE ADULT - ASSESSMENT
85M with pmhx of CKD (Cr 1.59 pre-op), hx of CVA on eliquis (held starting 8/15), CAD, AFib, HTN, iliac artery anuerysm, POD8 s/p open R inguinal hernia repair, umbilical hernia repair, RTOR exlap, evacuation of abdominal wall hematoma requiring 2 units PRBCs and 1 FFP. Pt doing well, tolerating regular diet, answering questions appropriately. Will get PT to work with him today for strengthening.    Diet: regular  Pain/Nausea   flomax   Metronidazole  IS, SCDs   PT today for strengthening  Home eliquis

## 2020-08-26 NOTE — CHART NOTE - NSCHARTNOTEFT_GEN_A_CORE
Admitting Diagnosis:   Patient is a 85y old  Male who presents with a chief complaint of hernia repair, elective, open R (23 Aug 2020 13:40)    PAST MEDICAL & SURGICAL HISTORY:  Atrial fibrillation  Confused: at night  Aneurysm of abdominal vessel  Polycystic kidney  Hyperparathyroidism  CVA (cerebral vascular accident): 2019  H/O prostate cancer  CKD (chronic kidney disease), stage III  HLD (hyperlipidemia)  HTN (hypertension)  CAD (coronary artery disease)  Atherosclerosis of aorto-iliac bypass graft: stent  S/P AAA repair  AICD (automatic cardioverter/defibrillator) present: [employer]  H/O hernia repair    Current Nutrition Order: Regular diet     PO Intake: Good (%) [ x  ]  Fair (50-75%) [   ] Poor (<25%) [   ]    GI Issues:   WDL, some distention noted  X2 large BMs 8/25 per RN  No n/v/d/c noted    Pain:  No pain noted at this time    Skin Integrity:  Surgical incision, deric score 14  No edema present  No pressure ulcers noted    Labs:   08-26    x   |  x   |  x   ----------------------------<  x   4.1   |  x   |  x     Ca    9.3      26 Aug 2020 06:18  Phos  2.2     08-26  Mg     2.2     08-26    Medications:  MEDICATIONS  (STANDING):  apixaban 2.5 milliGRAM(s) Oral two times a day  atorvastatin 80 milliGRAM(s) Oral daily  melatonin 5 milliGRAM(s) Oral at bedtime  metroNIDAZOLE    Tablet 500 milliGRAM(s) Oral three times a day  tamsulosin 0.4 milliGRAM(s) Oral at bedtime    MEDICATIONS  (PRN):  ondansetron Injectable 4 milliGRAM(s) IV Push every 8 hours PRN Nausea    Admitted Anthropometrics:  Height: 71" Weight: 166lbs, IBW 172lbs+/-10%, %IBW 97%, BMI 23.1 kg/m2    Weight:  8/17 166lbs    Weight Change: No new weights obtained this admission and no other hospital admissions/ recorded weights. UBW consistent with admission weight.     Nutrition Focused Physical Exam: Completed [   ]  Unable to complete [   ]- Unremarkable.     Estimated energy needs:   ABW (75kg) used for calculations as pt between % of IBW. Needs adjusted for advanced age.   Kcal (20-25 kcal/kg): 5941-5568 ml   Protein (1.0-1.2 g/kg pro): 75-90 g pro  Fluids (30-35 ml/kg): 8782-4217 ml    Subjective:   85M with hx of CKD (Cr 1.59 pre-op), hx of CVA on eliquis at home which was held starting 8/15, CAD, AFib, HTN, iliac artery anuerysm, now s/p elective procedure for repair of large right inguinal hernia complicated by bleed with RTOR 8/17, S/p evac of hematoma 8/18 stepped down from SICU yesterday s/p 2 units PRBCs and 1 FFP. Pt appears delirious- per EMR mental status improves in the am and pt sundowns. Per wife, pt has baseline issues with memory. Calorie count earlier initiated this weekend was lost- unable to complete calorie count at this time but per disc with team pt appetite is very good- disc with team. Pt mental status now improving and able to answer questions appropriately. On assessment, pt was resting in bed. Currently on regular diet tolerating Po very well. Per disc with RN and PCA at bedside, pt is consuming close to 100% of meals. No n/v/d/c noted. Cont to encourage adequate PO intake. No plan for education or diet change at this time as pt is tolerating regular diet without complaints, focus placed on overall PO intake. Please see nutrition recs below. RD to follow up per protocol.     Nutrition Diagnosis: Inadequate oral intake RT decreased willingness to eat likely 2/2 AMS and poor sleep AEB Pt consuming 0% of meals  -Resolved    No new nutr dx at this time    [  ] No active nutrition diagnosis at this time  [  ] Current medical condition precludes nutrition intervention    Goal: Consistently meet >75% est needs    Recommendations:  1. Cont with regular diet  2. Cont to encourage adequate PO intake  3. Cont to maintain hydration status  4. Monitor lytes and replete prn    Education: Cont to encourage adequate PO intake.     Risk Level: High [ x  ] Moderate [   ] Low [   ].

## 2020-08-26 NOTE — PROGRESS NOTE ADULT - SUBJECTIVE AND OBJECTIVE BOX
STATUS POST:  POD9 s/p open R inguinal hernia repair, umbilical hernia repair, RTOR exlap, evacuation of abdominal wall hematoma requiring 2 units PRBCs and 1 FF     SUBJECTIVE: Patient seen and examined bedside by chief resident. Walked down the cotto yesterday with assistance. CHARISMA. Tolerating regular diet    apixaban 2.5 milliGRAM(s) Oral two times a day  [email or web address]    Tablet 500 milliGRAM(s) Oral three times a day  tamsulosin 0.4 milliGRAM(s) Oral at bedtime      Vital Signs Last 24 Hrs  T(C): 36.7 (26 Aug 2020 04:58), Max: 37.1 (25 Aug 2020 08:48)  T(F): 98.1 (26 Aug 2020 04:58), Max: 98.7 (25 Aug 2020 08:48)  HR: 67 (26 Aug 2020 04:58) (67 - 76)  BP: 140/81 (26 Aug 2020 04:58) (101/67 - 140/81)  BP(mean): --  RR: 18 (26 Aug 2020 04:58) (17 - 18)  SpO2: 97% (26 Aug 2020 04:58) (87% - 97%)  I&O's Detail    24 Aug 2020 07:01  -  25 Aug 2020 07:00  --------------------------------------------------------  IN:    Oral Fluid: 360 mL  Total IN: 360 mL    OUT:    Incontinent per Condom Catheter: 600 mL    Voided: 600 mL  Total OUT: 1200 mL    Total NET: -840 mL      25 Aug 2020 07:01  -  26 Aug 2020 06:50  --------------------------------------------------------  IN:    Oral Fluid: 240 mL  Total IN: 240 mL    OUT:    Incontinent per Condom Catheter: 625 mL  Total OUT: 625 mL    Total NET: -385 mL          Physical Exam:  General: No acute distress, resting comfortably in bed  Neuro: conversing appropriately  Pulm: Nonlabored breathing, no respiratory distress, on RA  Abd: soft, non-tender, non-distended  Extrem: SCDs in place    LABS:                        8.4    9.66  )-----------( 180      ( 25 Aug 2020 05:57 )             25.9     08-25    140  |  109<H>  |  31<H>  ----------------------------<  113<H>  4.2   |  22  |  1.38<H>    Ca    9.1      25 Aug 2020 05:57  Phos  2.3     08-25  Mg     2.2     08-25            RADIOLOGY & ADDITIONAL STUDIES:

## 2020-08-26 NOTE — CHART NOTE - NSCHARTNOTEFT_GEN_A_CORE
Met with patients wife (Gloria) bedside along with Dr. Lacey Garcia(resident) and Sridevi Melendez(PT).     Discussed with family the recommendations for discharge. PT is recommending discharge to [facility].    Patient's wife is refusing discharge to [facility]. They state they don't want to send him to [facility] during covid and not be able to visit.    We discussed that discharge to [facility] is currently what is safest for the patient. While he is making progress with physical therapy and was able to ambulate a longer distance yesterday, he still is unsteady with the walk and requires a lot of direction and reorientation.      If the family is insistent on discharge to home they are going to need  2 person assist for all OOB mobility, Home PT, platform rolling walker, bedside commode, shower chair, and wheelchair for longer distance mobility (i.e. community ambulation, doctor office visits, etc). While we can set the patient up with home PT and home care they will need to hire a private aid in addition to the services the hospital offers to provide the adequate care that the patient needs.  The wife expressed her understanding of this and states she has already spoke with the unit  and was given a list of private hire agencies. She has spoke with 1 agency already and is planning to speak to another one this afternoon and they will finalize who they will be hiring.     Further reinforced that we are all just advocating for a safe discharge and all have the patient's best interest in mind.    At this point in time the patient should be medically cleared for discharge pending progress on Friday 8/28. Met with patients wife (Gloria) bedside along with Dr. Lacey Garcia(resident) and Sridevi Melendez(PT).     Discussed with family the recommendations for discharge. PT is recommending discharge to [facility].    Patient's wife is refusing discharge to [facility]. They state they don't want to send him to [facility] during covid and not be able to visit.    We discussed that discharge to [facility] is currently what is safest for the patient. While he is making progress with physical therapy and was able to ambulate a longer distance yesterday, he still is unsteady with the walk and requires a lot of direction and reorientation.      If the family is insistent on discharge to home they are going to need  2 person assist for all OOB mobility, Home PT, platform rolling walker, bedside commode, shower chair, and wheelchair for longer distance mobility (i.e. community ambulation, doctor office visits, etc). While we can set the patient up with home PT and home care they will need to hire a private aid in addition to the services the hospital offers to provide the adequate care that the patient needs.  The wife expressed her understanding of this and states she has already spoke with the unit  and was given a list of private hire agencies. She has spoke with 1 agency already and is planning to speak to another one this afternoon and they will finalize who they will be hiring. Family is also looking into private hire physical therapist.    Further reinforced that we are all just advocating for a safe discharge and all have the patient's best interest in mind.    At this point in time the patient should be medically cleared for discharge pending progress on Friday 8/28.

## 2020-08-27 LAB
ANION GAP SERPL CALC-SCNC: 7 MMOL/L — SIGNIFICANT CHANGE UP (ref 5–17)
BUN SERPL-MCNC: 35 MG/DL — HIGH (ref 7–23)
CALCIUM SERPL-MCNC: 9.2 MG/DL — SIGNIFICANT CHANGE UP (ref 8.4–10.5)
CHLORIDE SERPL-SCNC: 106 MMOL/L — SIGNIFICANT CHANGE UP (ref 96–108)
CO2 SERPL-SCNC: 23 MMOL/L — SIGNIFICANT CHANGE UP (ref 22–31)
CREAT SERPL-MCNC: 1.39 MG/DL — HIGH (ref 0.5–1.3)
GLUCOSE SERPL-MCNC: 111 MG/DL — HIGH (ref 70–99)
HCT VFR BLD CALC: 25.7 % — LOW (ref 39–50)
HGB BLD-MCNC: 8.3 G/DL — LOW (ref 13–17)
MAGNESIUM SERPL-MCNC: 1.8 MG/DL — SIGNIFICANT CHANGE UP (ref 1.6–2.6)
MCHC RBC-ENTMCNC: 28.7 PG — SIGNIFICANT CHANGE UP (ref 27–34)
MCHC RBC-ENTMCNC: 32.3 GM/DL — SIGNIFICANT CHANGE UP (ref 32–36)
MCV RBC AUTO: 88.9 FL — SIGNIFICANT CHANGE UP (ref 80–100)
NRBC # BLD: 0 /100 WBCS — SIGNIFICANT CHANGE UP (ref 0–0)
PHOSPHATE SERPL-MCNC: 2.3 MG/DL — LOW (ref 2.5–4.5)
PLATELET # BLD AUTO: 228 K/UL — SIGNIFICANT CHANGE UP (ref 150–400)
POTASSIUM SERPL-MCNC: 4.2 MMOL/L — SIGNIFICANT CHANGE UP (ref 3.5–5.3)
POTASSIUM SERPL-SCNC: 4.2 MMOL/L — SIGNIFICANT CHANGE UP (ref 3.5–5.3)
RBC # BLD: 2.89 M/UL — LOW (ref 4.2–5.8)
RBC # FLD: 13.6 % — SIGNIFICANT CHANGE UP (ref 10.3–14.5)
SODIUM SERPL-SCNC: 136 MMOL/L — SIGNIFICANT CHANGE UP (ref 135–145)
WBC # BLD: 8.08 K/UL — SIGNIFICANT CHANGE UP (ref 3.8–10.5)
WBC # FLD AUTO: 8.08 K/UL — SIGNIFICANT CHANGE UP (ref 3.8–10.5)

## 2020-08-27 RX ORDER — SODIUM,POTASSIUM PHOSPHATES 278-250MG
1 POWDER IN PACKET (EA) ORAL ONCE
Refills: 0 | Status: COMPLETED | OUTPATIENT
Start: 2020-08-27 | End: 2020-08-27

## 2020-08-27 RX ADMIN — APIXABAN 2.5 MILLIGRAM(S): 2.5 TABLET, FILM COATED ORAL at 17:12

## 2020-08-27 RX ADMIN — Medication 1 PACKET(S): at 11:51

## 2020-08-27 RX ADMIN — ATORVASTATIN CALCIUM 80 MILLIGRAM(S): 80 TABLET, FILM COATED ORAL at 21:49

## 2020-08-27 RX ADMIN — TAMSULOSIN HYDROCHLORIDE 0.4 MILLIGRAM(S): 0.4 CAPSULE ORAL at 21:49

## 2020-08-27 RX ADMIN — APIXABAN 2.5 MILLIGRAM(S): 2.5 TABLET, FILM COATED ORAL at 06:29

## 2020-08-27 RX ADMIN — Medication 5 MILLIGRAM(S): at 21:49

## 2020-08-27 NOTE — PROGRESS NOTE ADULT - ASSESSMENT
85M with pmhx of CKD (Cr 1.59 pre-op), hx of CVA on eliquis (held starting 8/15), CAD, AFib, HTN, iliac artery anuerysm, POD10 s/p open R inguinal hernia repair, umbilical hernia repair, RTOR exlap, evacuation of abdominal wall hematoma requiring 2 units PRBCs and 1 FFP. Pt doing well, tolerating regular diet, answering questions appropriately. A lengthy discussion was had yesterday about pt's PT home needs. Wife will attempt to hire independently as documented on Chart note yesterday.    f/u with wife about finding private PT  Diet: regular  Pain/Nausea   flomax   Metronidazole  IS, SCDs   PT today for strengthening  Home eliquis

## 2020-08-27 NOTE — PROGRESS NOTE ADULT - SUBJECTIVE AND OBJECTIVE BOX
STATUS POST:  POD10 s/p open R inguinal hernia repair, umbilical hernia repair, RTOR exlap, evacuation of abdominal wall hematoma requiring 2 units PRBCs and 1 FF     SUBJECTIVE: Patient seen and examined bedside by chief resident. CHARISMA. Tolerating regular diet. Reports continued abd pain in RLQ.    apixaban 2.5 milliGRAM(s) Oral two times a day  tamsulosin 0.4 milliGRAM(s) Oral at bedtime      Vital Signs Last 24 Hrs  T(C): 36.8 (27 Aug 2020 05:03), Max: 36.8 (26 Aug 2020 08:12)  T(F): 98.3 (27 Aug 2020 05:03), Max: 98.3 (26 Aug 2020 08:12)  HR: 67 (27 Aug 2020 05:03) (57 - 99)  BP: 112/63 (27 Aug 2020 05:03) (105/60 - 132/81)  BP(mean): --  RR: 18 (27 Aug 2020 05:03) (16 - 18)  SpO2: 96% (27 Aug 2020 05:03) (92% - 98%)  I&O's Detail    26 Aug 2020 07:01  -  27 Aug 2020 07:00  --------------------------------------------------------  IN:    Oral Fluid: 1350 mL  Total IN: 1350 mL    OUT:    Incontinent per Condom Catheter: 2850 mL  Total OUT: 2850 mL    Total NET: -1500 mL          Physical Exam:  General: No acute distress, resting comfortably in bed  Pulm: Nonlabored breathing, no respiratory distress, on RA  Abd: soft, mildly-TTP in RLQ, non-distended  Extrem: SCDs in place    LABS:                        8.9    9.25  )-----------( 206      ( 26 Aug 2020 10:30 )             28.6     08-26    x   |  x   |  x   ----------------------------<  x   4.1   |  x   |  x     Ca    9.3      26 Aug 2020 06:18  Phos  2.2     08-26  Mg     2.2     08-26            RADIOLOGY & ADDITIONAL STUDIES:

## 2020-08-28 ENCOUNTER — TRANSCRIPTION ENCOUNTER (OUTPATIENT)
Age: 85
End: 2020-08-28

## 2020-08-28 DIAGNOSIS — R53.81 OTHER MALAISE: ICD-10-CM

## 2020-08-28 DIAGNOSIS — F03.90 UNSPECIFIED DEMENTIA WITHOUT BEHAVIORAL DISTURBANCE: ICD-10-CM

## 2020-08-28 LAB
ANION GAP SERPL CALC-SCNC: 10 MMOL/L — SIGNIFICANT CHANGE UP (ref 5–17)
BUN SERPL-MCNC: 36 MG/DL — HIGH (ref 7–23)
CALCIUM SERPL-MCNC: 9.7 MG/DL — SIGNIFICANT CHANGE UP (ref 8.4–10.5)
CHLORIDE SERPL-SCNC: 108 MMOL/L — SIGNIFICANT CHANGE UP (ref 96–108)
CO2 SERPL-SCNC: 23 MMOL/L — SIGNIFICANT CHANGE UP (ref 22–31)
CREAT SERPL-MCNC: 1.42 MG/DL — HIGH (ref 0.5–1.3)
GLUCOSE SERPL-MCNC: 122 MG/DL — HIGH (ref 70–99)
HCT VFR BLD CALC: 27.9 % — LOW (ref 39–50)
HGB BLD-MCNC: 8.8 G/DL — LOW (ref 13–17)
MAGNESIUM SERPL-MCNC: 1.9 MG/DL — SIGNIFICANT CHANGE UP (ref 1.6–2.6)
MCHC RBC-ENTMCNC: 28.3 PG — SIGNIFICANT CHANGE UP (ref 27–34)
MCHC RBC-ENTMCNC: 31.5 GM/DL — LOW (ref 32–36)
MCV RBC AUTO: 89.7 FL — SIGNIFICANT CHANGE UP (ref 80–100)
NRBC # BLD: 0 /100 WBCS — SIGNIFICANT CHANGE UP (ref 0–0)
PHOSPHATE SERPL-MCNC: 2.5 MG/DL — SIGNIFICANT CHANGE UP (ref 2.5–4.5)
PLATELET # BLD AUTO: 271 K/UL — SIGNIFICANT CHANGE UP (ref 150–400)
POTASSIUM SERPL-MCNC: 4.1 MMOL/L — SIGNIFICANT CHANGE UP (ref 3.5–5.3)
POTASSIUM SERPL-SCNC: 4.1 MMOL/L — SIGNIFICANT CHANGE UP (ref 3.5–5.3)
RBC # BLD: 3.11 M/UL — LOW (ref 4.2–5.8)
RBC # FLD: 13.8 % — SIGNIFICANT CHANGE UP (ref 10.3–14.5)
SODIUM SERPL-SCNC: 141 MMOL/L — SIGNIFICANT CHANGE UP (ref 135–145)
WBC # BLD: 10.21 K/UL — SIGNIFICANT CHANGE UP (ref 3.8–10.5)
WBC # FLD AUTO: 10.21 K/UL — SIGNIFICANT CHANGE UP (ref 3.8–10.5)

## 2020-08-28 RX ADMIN — ATORVASTATIN CALCIUM 80 MILLIGRAM(S): 80 TABLET, FILM COATED ORAL at 21:07

## 2020-08-28 RX ADMIN — Medication 5 MILLIGRAM(S): at 21:03

## 2020-08-28 RX ADMIN — APIXABAN 2.5 MILLIGRAM(S): 2.5 TABLET, FILM COATED ORAL at 06:00

## 2020-08-28 RX ADMIN — APIXABAN 2.5 MILLIGRAM(S): 2.5 TABLET, FILM COATED ORAL at 17:46

## 2020-08-28 RX ADMIN — TAMSULOSIN HYDROCHLORIDE 0.4 MILLIGRAM(S): 0.4 CAPSULE ORAL at 21:03

## 2020-08-28 NOTE — PROGRESS NOTE ADULT - ASSESSMENT
85M with pmhx of CKD (Cr 1.59 pre-op), hx of CVA on eliquis (held starting 8/15), CAD, AFib, HTN, iliac artery anuerysm, POD10 s/p open R inguinal hernia repair, umbilical hernia repair, RTOR exlap, evacuation of abdominal wall hematoma requiring 2 units PRBCs and 1 FFP. Pt doing well, tolerating regular diet, answering questions appropriately, NAEON. WIll talk to wife about progress with obtaining private PT hire.    f/u with wife about finding private PT  Diet: regular  Pain/Nausea   flomax   Metronidazole  IS, SCDs   PT today for strengthening  Home eliquis

## 2020-08-28 NOTE — DISCHARGE NOTE NURSING/CASE MANAGEMENT/SOCIAL WORK - PATIENT PORTAL LINK FT
You can access the FollowMyHealth Patient Portal offered by [facility] by registering at the following website: [email or web address]. By joining [facility]’s FollowMyHealth portal, you will also be able to view your health information using other applications (apps) compatible with our system.

## 2020-08-28 NOTE — PROGRESS NOTE ADULT - SUBJECTIVE AND OBJECTIVE BOX
STATUS POST:  POD11 s/p open R inguinal hernia repair, umbilical hernia repair, RTOR exlap, evacuation of abdominal wall hematoma requiring 2 units PRBCs and 1 FF     SUBJECTIVE: NAEON. Patient seen and examined bedside by chief resident. Doing well this AM. Reported some mild abd pain in RLQ    apixaban 2.5 milliGRAM(s) Oral two times a day  tamsulosin 0.4 milliGRAM(s) Oral at bedtime      Vital Signs Last 24 Hrs  T(C): 36.8 (28 Aug 2020 05:26), Max: 37.2 (28 Aug 2020 00:00)  T(F): 98.2 (28 Aug 2020 05:26), Max: 98.9 (28 Aug 2020 00:00)  HR: 79 (28 Aug 2020 05:26) (61 - 88)  BP: 109/77 (28 Aug 2020 05:26) (102/67 - 116/68)  BP(mean): --  RR: 18 (28 Aug 2020 05:26) (18 - 20)  SpO2: 96% (28 Aug 2020 05:26) (96% - 98%)  I&O's Detail    27 Aug 2020 07:01  -  28 Aug 2020 07:00  --------------------------------------------------------  IN:  Total IN: 0 mL    OUT:    Intermittent Catheterization - Urethral: 750 mL  Total OUT: 750 mL    Total NET: -750 mL          Physical Exam:  General: No acute distress, resting comfortably in bed  Pulm: Nonlabored breathing, no respiratory distress, on RA  Abd: soft, mildly TTP in RLQ, non-distended, incisions clean/dry/intact.  Extrem:  SCDs in place    LABS:                        8.3    8.08  )-----------( 228      ( 27 Aug 2020 07:00 )             25.7     08-27    136  |  106  |  35<H>  ----------------------------<  111<H>  4.2   |  23  |  1.39<H>    Ca    9.2      27 Aug 2020 07:00  Phos  2.3     08-27  Mg     1.8     08-27            RADIOLOGY & ADDITIONAL STUDIES:

## 2020-08-29 VITALS
TEMPERATURE: 97 F | SYSTOLIC BLOOD PRESSURE: 108 MMHG | OXYGEN SATURATION: 100 % | RESPIRATION RATE: 18 BRPM | HEART RATE: 76 BPM | DIASTOLIC BLOOD PRESSURE: 68 MMHG

## 2020-08-29 LAB
ANION GAP SERPL CALC-SCNC: 9 MMOL/L — SIGNIFICANT CHANGE UP (ref 5–17)
BUN SERPL-MCNC: 36 MG/DL — HIGH (ref 7–23)
CALCIUM SERPL-MCNC: 9.8 MG/DL — SIGNIFICANT CHANGE UP (ref 8.4–10.5)
CHLORIDE SERPL-SCNC: 108 MMOL/L — SIGNIFICANT CHANGE UP (ref 96–108)
CO2 SERPL-SCNC: 20 MMOL/L — LOW (ref 22–31)
CREAT SERPL-MCNC: 1.44 MG/DL — HIGH (ref 0.5–1.3)
GLUCOSE SERPL-MCNC: 108 MG/DL — HIGH (ref 70–99)
HCT VFR BLD CALC: 29.3 % — LOW (ref 39–50)
HGB BLD-MCNC: 9.3 G/DL — LOW (ref 13–17)
MAGNESIUM SERPL-MCNC: 1.9 MG/DL — SIGNIFICANT CHANGE UP (ref 1.6–2.6)
MCHC RBC-ENTMCNC: 28.6 PG — SIGNIFICANT CHANGE UP (ref 27–34)
MCHC RBC-ENTMCNC: 31.7 GM/DL — LOW (ref 32–36)
MCV RBC AUTO: 90.2 FL — SIGNIFICANT CHANGE UP (ref 80–100)
NRBC # BLD: 0 /100 WBCS — SIGNIFICANT CHANGE UP (ref 0–0)
PHOSPHATE SERPL-MCNC: 3 MG/DL — SIGNIFICANT CHANGE UP (ref 2.5–4.5)
PLATELET # BLD AUTO: 300 K/UL — SIGNIFICANT CHANGE UP (ref 150–400)
POTASSIUM SERPL-MCNC: 4.6 MMOL/L — SIGNIFICANT CHANGE UP (ref 3.5–5.3)
POTASSIUM SERPL-SCNC: 4.6 MMOL/L — SIGNIFICANT CHANGE UP (ref 3.5–5.3)
RBC # BLD: 3.25 M/UL — LOW (ref 4.2–5.8)
RBC # FLD: 13.9 % — SIGNIFICANT CHANGE UP (ref 10.3–14.5)
SODIUM SERPL-SCNC: 137 MMOL/L — SIGNIFICANT CHANGE UP (ref 135–145)
WBC # BLD: 9.8 K/UL — SIGNIFICANT CHANGE UP (ref 3.8–10.5)
WBC # FLD AUTO: 9.8 K/UL — SIGNIFICANT CHANGE UP (ref 3.8–10.5)

## 2020-08-29 PROCEDURE — 97110 THERAPEUTIC EXERCISES: CPT

## 2020-08-29 PROCEDURE — 85025 COMPLETE CBC W/AUTO DIFF WBC: CPT

## 2020-08-29 PROCEDURE — 88302 TISSUE EXAM BY PATHOLOGIST: CPT

## 2020-08-29 PROCEDURE — 84132 ASSAY OF SERUM POTASSIUM: CPT

## 2020-08-29 PROCEDURE — 84295 ASSAY OF SERUM SODIUM: CPT

## 2020-08-29 PROCEDURE — C1889: CPT

## 2020-08-29 PROCEDURE — 97530 THERAPEUTIC ACTIVITIES: CPT

## 2020-08-29 PROCEDURE — 85610 PROTHROMBIN TIME: CPT

## 2020-08-29 PROCEDURE — 81001 URINALYSIS AUTO W/SCOPE: CPT

## 2020-08-29 PROCEDURE — 80048 BASIC METABOLIC PNL TOTAL CA: CPT

## 2020-08-29 PROCEDURE — 83735 ASSAY OF MAGNESIUM: CPT

## 2020-08-29 PROCEDURE — 86901 BLOOD TYPING SEROLOGIC RH(D): CPT

## 2020-08-29 PROCEDURE — 85027 COMPLETE CBC AUTOMATED: CPT

## 2020-08-29 PROCEDURE — 84484 ASSAY OF TROPONIN QUANT: CPT

## 2020-08-29 PROCEDURE — 36415 COLL VENOUS BLD VENIPUNCTURE: CPT

## 2020-08-29 PROCEDURE — 82330 ASSAY OF CALCIUM: CPT

## 2020-08-29 PROCEDURE — 70450 CT HEAD/BRAIN W/O DYE: CPT

## 2020-08-29 PROCEDURE — 86850 RBC ANTIBODY SCREEN: CPT

## 2020-08-29 PROCEDURE — 82553 CREATINE MB FRACTION: CPT

## 2020-08-29 PROCEDURE — 87449 NOS EACH ORGANISM AG IA: CPT

## 2020-08-29 PROCEDURE — 74018 RADEX ABDOMEN 1 VIEW: CPT

## 2020-08-29 PROCEDURE — 86923 COMPATIBILITY TEST ELECTRIC: CPT

## 2020-08-29 PROCEDURE — 82550 ASSAY OF CK (CPK): CPT

## 2020-08-29 PROCEDURE — 73130 X-RAY EXAM OF HAND: CPT

## 2020-08-29 PROCEDURE — 88304 TISSUE EXAM BY PATHOLOGIST: CPT

## 2020-08-29 PROCEDURE — P9016: CPT

## 2020-08-29 PROCEDURE — 82803 BLOOD GASES ANY COMBINATION: CPT

## 2020-08-29 PROCEDURE — 84100 ASSAY OF PHOSPHORUS: CPT

## 2020-08-29 PROCEDURE — C1781: CPT

## 2020-08-29 PROCEDURE — 85018 HEMOGLOBIN: CPT

## 2020-08-29 PROCEDURE — 87324 CLOSTRIDIUM AG IA: CPT

## 2020-08-29 PROCEDURE — 85730 THROMBOPLASTIN TIME PARTIAL: CPT

## 2020-08-29 PROCEDURE — 97161 PT EVAL LOW COMPLEX 20 MIN: CPT

## 2020-08-29 PROCEDURE — P9059: CPT

## 2020-08-29 PROCEDURE — 36430 TRANSFUSION BLD/BLD COMPNT: CPT

## 2020-08-29 PROCEDURE — 83605 ASSAY OF LACTIC ACID: CPT

## 2020-08-29 PROCEDURE — 97116 GAIT TRAINING THERAPY: CPT

## 2020-08-29 PROCEDURE — 82962 GLUCOSE BLOOD TEST: CPT

## 2020-08-29 RX ADMIN — APIXABAN 2.5 MILLIGRAM(S): 2.5 TABLET, FILM COATED ORAL at 05:01

## 2020-08-29 NOTE — PROGRESS NOTE ADULT - SUBJECTIVE AND OBJECTIVE BOX
STATUS POST:  POD12 s/p open R inguinal hernia repair, umbilical hernia repair, RTOR exlap, evacuation of abdominal wall hematoma requiring 2 units PRBCs and 1 FF     SUBJECTIVE: Patient seen and examined bedside by chief residentMoises ZAFAR. Pt doing well this AM, tolerating regular diet.    apixaban 2.5 milliGRAM(s) Oral two times a day  tamsulosin 0.4 milliGRAM(s) Oral at bedtime      Vital Signs Last 24 Hrs  T(C): 36.6 (29 Aug 2020 05:11), Max: 36.8 (28 Aug 2020 08:39)  T(F): 97.8 (29 Aug 2020 05:11), Max: 98.2 (28 Aug 2020 08:39)  HR: 77 (29 Aug 2020 05:11) (70 - 77)  BP: 108/75 (29 Aug 2020 05:11) (108/75 - 121/71)  BP(mean): --  RR: 17 (29 Aug 2020 05:11) (16 - 17)  SpO2: 96% (29 Aug 2020 05:11) (96% - 98%)  I&O's Detail    28 Aug 2020 07:01  -  29 Aug 2020 07:00  --------------------------------------------------------  IN:    Oral Fluid: 140 mL  Total IN: 140 mL    OUT:    Incontinent per Condom Catheter: 1550 mL  Total OUT: 1550 mL    Total NET: -1410 mL          Physical Exam:  General: No acute distress, resting comfortably in bed  Pulm: Nonlabored breathing, no respiratory distress, on RA  Abd:  soft, mildly-TTP in RLQ, non-distended, dressing in place over RLQ  Extrem: SCDs in place    LABS:                        9.3    9.80  )-----------( 300      ( 29 Aug 2020 07:10 )             29.3     08-28    141  |  108  |  36<H>  ----------------------------<  122<H>  4.1   |  23  |  1.42<H>    Ca    9.7      28 Aug 2020 10:39  Phos  2.5     08-28  Mg     1.9     08-28            RADIOLOGY & ADDITIONAL STUDIES:

## 2020-08-29 NOTE — PROGRESS NOTE ADULT - PROVIDER SPECIALTY LIST ADULT
Neurology
SICU
SICU
Surgery

## 2020-08-29 NOTE — PROGRESS NOTE ADULT - ASSESSMENT
85M with pmhx of CKD (Cr 1.59 pre-op), hx of CVA on eliquis (held starting 8/15), CAD, AFib, HTN, iliac artery anuerysm, POD10 s/p open R inguinal hernia repair, umbilical hernia repair, RTOR exlap, evacuation of abdominal wall hematoma requiring 2 units PRBCs and 1 FFP. Pt doing well, tolerating regular diet, answering questions appropriately, NAEON. Home services set up for pt, will be transported at 1pm.      Diet: regular  Pain/Nausea   flomax   IS, SCDs   Home eliquis  Home today

## 2020-09-15 ENCOUNTER — APPOINTMENT (OUTPATIENT)
Dept: SURGERY | Facility: CLINIC | Age: 85
End: 2020-09-15
Payer: MEDICARE

## 2020-09-15 VITALS
HEIGHT: 68 IN | DIASTOLIC BLOOD PRESSURE: 86 MMHG | SYSTOLIC BLOOD PRESSURE: 148 MMHG | TEMPERATURE: 97.2 F | HEART RATE: 57 BPM | WEIGHT: 161 LBS | BODY MASS INDEX: 24.4 KG/M2 | OXYGEN SATURATION: 97 %

## 2020-09-15 DIAGNOSIS — K40.90 UNILATERAL INGUINAL HERNIA, W/OUT OBSTRUCTION OR GANGRENE, NOT SPECIFIED AS RECURRENT: ICD-10-CM

## 2020-09-15 PROCEDURE — 99024 POSTOP FOLLOW-UP VISIT: CPT

## 2020-09-16 PROBLEM — K40.90 RIGHT INGUINAL HERNIA: Status: ACTIVE | Noted: 2020-07-21

## 2020-09-16 NOTE — PHYSICAL EXAM
[Normal] : affect appropriate [de-identified] : incisions well healing, clean, no signs of infection [de-identified] : using wheelchair

## 2020-09-16 NOTE — HISTORY OF PRESENT ILLNESS
[de-identified] : 84 y/o M presents today for post op s/p hematoma evacuation s/p R inguinal hernia repair. Pt is doing generally well today but states he feels tired. He denies abdominal pain but endorses groin pain. He finished his course of antibiotics one week ago with no recurrent febrile symptoms. Pt did CT scans at [facility]. He reports that he had diarrhea but it has been improving. Pt presents with his wife who states that he has lost weight and his appetite varies.

## 2020-09-16 NOTE — ASSESSMENT
[FreeTextEntry1] : 86 y/o M presents today for post op s/p hematoma evacuation s/p R inguinal hernia repair. He presents with his wife and I also spoke to his daughter on the phone. Pt is doing generally well. He finished abx one week ago and denies fever, chills. Incisions examined were clean and well healed with no signs of infection. We discussed some of the symptoms to look for that would signify infection or decline of the patient's health. I encouraged pt to return to his normal routine as much as possible. I asked pt to send me recent CT's from [facility] so I can review them. He will follow up in a few weeks with CTs.

## 2020-09-16 NOTE — END OF VISIT
[FreeTextEntry3] : All medical record entries made by the Scribe were at my, Dr. Light's, discretion and personally dictated by me on 09/15/2020. I have reviewed the chart and agree that the record accurately reflects my personal performance of the history, physical exam, assessment and plan. I have also personally directed, reviewed and agreed to the chart.

## 2020-09-16 NOTE — ADDENDUM
[FreeTextEntry1] : This note was written by Trish Samano on 02/18/2020 acting as scribe for Dr. Light.

## 2020-09-24 ENCOUNTER — APPOINTMENT (OUTPATIENT)
Dept: NEPHROLOGY | Facility: CLINIC | Age: 85
End: 2020-09-24
Payer: MEDICARE

## 2020-09-24 VITALS — HEART RATE: 64 BPM | DIASTOLIC BLOOD PRESSURE: 67 MMHG | SYSTOLIC BLOOD PRESSURE: 110 MMHG

## 2020-09-24 DIAGNOSIS — Z23 ENCOUNTER FOR IMMUNIZATION: ICD-10-CM

## 2020-09-24 PROCEDURE — 36415 COLL VENOUS BLD VENIPUNCTURE: CPT

## 2020-09-24 PROCEDURE — G0008: CPT

## 2020-09-24 PROCEDURE — 99214 OFFICE O/P EST MOD 30 MIN: CPT | Mod: 25

## 2020-09-24 PROCEDURE — 90662 IIV NO PRSV INCREASED AG IM: CPT

## 2020-09-24 RX ORDER — EZETIMIBE 10 MG/1
10 TABLET ORAL
Qty: 90 | Refills: 3 | Status: DISCONTINUED | COMMUNITY
Start: 2020-08-11 | End: 2020-09-24

## 2020-09-24 NOTE — ASSESSMENT
[FreeTextEntry1] : # HTN controlled.\par * Asymptomatic low BP. Defer to cardiologist regarding dosing for low BP.\par * The patient's blood pressure was checked with the [employer] [id number] using the SPRINT trial protocol after sitting quietly in an empty room with arm supported, back supported, and feet on the floor for 5 minutes. The average of 3 readings were taken.\par * A counseling information sheet has been given (currently or previously, in-person or electronically). All their questions were answered.\par * The patient has been counseled to check their BP at home with an automatic arm cuff, write down the readings, and reach me directly on the phone immediately if they are persistently > 180 systolic or if SBP is less than 100 or if lightheadedness develops. They were counseled to bring in all blood pressure readings and medications next visit.\par * The patient has been counseled that they have a a significant medical condition and regular office followup (at least every 3 months for now) is essential for monitoring, and that it is their responsibility to make follow up appointments.\par * The patient also has been counseled that they must never stop or change any medications without discussing this with me (or another physician). 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 \par * The patient has been counseled that chronic kidney disease is a significant condition and regular office followup with me (at least every 3 months for now) is essential for monitoring, and that it is their responsibility to make a follow up appointment.\par * A counseling information sheet has been given (currently or previously, in-person or electronically). All their questions were answered.\par * The patient has been counseled never to stop taking their medications without discussing it with me or another doctor.\par * The patient has been counseled on risk of acute renal failure and instructed to immediately call and speak with me or go immediately to ER with any severe symptoms, nausea, vomiting, diarrhea, chest pain, or shortness of breath.\par * The patient has been counseled on avoiding NSAIDs.\par \par # Possible primary hyperparathyroidism and hypercalcemia. \par * Recheck labs, including ionized calcium and PTH.\par * Risks of parathyroidectomy may outweigh benefits. He will avoid calcium/vitamin D.

## 2020-09-24 NOTE — HISTORY OF PRESENT ILLNESS
[FreeTextEntry1] : He is here with his wife who was also provided counseling.\par \par * Events reviewed. Underwent inguinal hernia repair complicated by wound infection. Now afebrile, no pain, off antibiotics. * Labs from 9/17 reviewed: creatinine 1.8, calcium 10.8. No calcium or vitamin D. * BP controlled, Lisinopril had been stopped during hospitalization. \par \par Previous history (11Aug20): * HTN controlled. No lightheadedness. Lisinopril decreased to 2.5 by cardiologist. * CKD stable, creatinine 1.81. * Hypercalcium improved to 9.7. Previous . \par \par *Previous history ([id number]):  CKD stable, creatinine 1.7 on Jan 21. * HTN controlled. No lightheadedness. Compliant with medications. * Calcium 10.3 with . * No family history of aneurysm or stroke. Slight headache last week which resolved. \par \par Previous history (10Oct19): * CKD slowly progressive, creatinine 1.84 on [id number]. * HTN controlled. No lightheadedness. Compliant with medications. * Calcium decreased to 10. . \par \par Previous history (11Jun19): * Labs reviewed from 5/28. CKD stable, creatinine 1.65. * HTN controlled. No lightheadedness. Compliant with medications. Lisinopril had been increased to 10 mg.* In April 2019 he had a CVA and was started on eliquis. This was not reportedly caused by a berry aneurysm. He has slight aphasia. \par \par Previous history (10Eqp95): * Labs from [facility] reviewed. Creatinine 1.62 in 11/18. * Borderline hypercalcemia, calcium 10.5. 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 * HTN controlled. No lightheadedness. Compliant with medications. Following low salt diet.\par \par Previous history (40Ekg00): Referred by Dr. Gardiner for CKD. Found to have creatinine of 1.5 (GFR 43) on October 24, 2017. No NSAIDS. 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 A CT scan in 2015 revealed bilateral enlarged kidneys with innumerable cysts. His uncles (father's side) and nieces have had a similar issue. He is unsure whether his father had kidney disease. * His PTH was 94 with a calcium of 10.8 and he is being evaluated by Dr. Gardiner and Dr. Coep. HTN controlled on lisinopril 20. No lightheadedness. \par \par PCP: Dr. Chavez Lopez

## 2020-09-24 NOTE — PHYSICAL EXAM
[General Appearance - Alert] : alert [General Appearance - In No Acute Distress] : in no acute distress [Sclera] : the sclera and conjunctiva were normal [PERRL With Normal Accommodation] : pupils were equal in size, round, and reactive to light [Extraocular Movements] : extraocular movements were intact [Outer Ear] : the ears and nose were normal in appearance [Oropharynx] : the oropharynx was normal [Neck Appearance] : the appearance of the neck was normal [Neck Cervical Mass (___cm)] : no neck mass was observed [Jugular Venous Distention Increased] : there was no jugular-venous distention [Thyroid Diffuse Enlargement] : the thyroid was not enlarged [Thyroid Nodule] : there were no palpable thyroid nodules [Auscultation Breath Sounds / Voice Sounds] : lungs were clear to auscultation bilaterally [Heart Rate And Rhythm] : heart rate was normal and rhythm regular [Heart Sounds] : normal S1 and S2 [Heart Sounds Gallop] : no gallops [Murmurs] : no murmurs [Heart Sounds Pericardial Friction Rub] : no pericardial rub [Edema] : there was no peripheral edema [Bowel Sounds] : normal bowel sounds [Abdomen Soft] : soft [Abdomen Tenderness] : non-tender [Abdomen Mass (___ Cm)] : no abdominal mass palpated [Cervical Lymph Nodes Enlarged Posterior Bilaterally] : posterior cervical [Cervical Lymph Nodes Enlarged Anterior Bilaterally] : anterior cervical [Supraclavicular Lymph Nodes Enlarged Bilaterally] : supraclavicular [Abnormal Walk] : normal gait [Nail Clubbing] : no clubbing  or cyanosis of the fingernails [Musculoskeletal - Swelling] : no joint swelling seen [Motor Tone] : muscle strength and tone were normal [Skin Color & Pigmentation] : normal skin color and pigmentation [Skin Turgor] : normal skin turgor [] : no rash [Oriented To Time, Place, And Person] : oriented to person, place, and time [Affect] : the affect was normal

## 2020-09-30 LAB
ALBUMIN SERPL ELPH-MCNC: 3.6 G/DL
ALP BLD-CCNC: 98 U/L
ALT SERPL-CCNC: 17 U/L
ANION GAP SERPL CALC-SCNC: 13 MMOL/L
AST SERPL-CCNC: 21 U/L
BASOPHILS # BLD AUTO: 0.02 K/UL
BASOPHILS NFR BLD AUTO: 0.4 %
BILIRUB SERPL-MCNC: 0.4 MG/DL
BUN SERPL-MCNC: 52 MG/DL
CA-I SERPL-SCNC: 1.44 MMOL/L
CALCIUM SERPL-MCNC: 10.2 MG/DL
CALCIUM SERPL-MCNC: 10.2 MG/DL
CHLORIDE SERPL-SCNC: 107 MMOL/L
CO2 SERPL-SCNC: 20 MMOL/L
CREAT SERPL-MCNC: 1.69 MG/DL
EOSINOPHIL # BLD AUTO: 0.48 K/UL
EOSINOPHIL NFR BLD AUTO: 8.8 %
GLUCOSE SERPL-MCNC: 83 MG/DL
HCT VFR BLD CALC: 30.8 %
HGB BLD-MCNC: 9.4 G/DL
IMM GRANULOCYTES NFR BLD AUTO: 0.2 %
LYMPHOCYTES # BLD AUTO: 1.32 K/UL
LYMPHOCYTES NFR BLD AUTO: 24.2 %
MAGNESIUM SERPL-MCNC: 1.9 MG/DL
MAN DIFF?: NORMAL
MCHC RBC-ENTMCNC: 28.7 PG
MCHC RBC-ENTMCNC: 30.5 GM/DL
MCV RBC AUTO: 93.9 FL
MONOCYTES # BLD AUTO: 0.82 K/UL
MONOCYTES NFR BLD AUTO: 15 %
NEUTROPHILS # BLD AUTO: 2.8 K/UL
NEUTROPHILS NFR BLD AUTO: 51.4 %
PARATHYROID HORMONE INTACT: 82 PG/ML
PLATELET # BLD AUTO: 186 K/UL
POTASSIUM SERPL-SCNC: 4.3 MMOL/L
PROT SERPL-MCNC: 6.3 G/DL
RBC # BLD: 3.28 M/UL
RBC # FLD: 16 %
SODIUM SERPL-SCNC: 139 MMOL/L
WBC # FLD AUTO: 5.45 K/UL

## 2020-10-13 ENCOUNTER — TRANSCRIPTION ENCOUNTER (OUTPATIENT)
Age: 85
End: 2020-10-13

## 2020-11-04 ENCOUNTER — APPOINTMENT (OUTPATIENT)
Dept: NEPHROLOGY | Facility: CLINIC | Age: 85
End: 2020-11-04

## 2020-12-17 ENCOUNTER — APPOINTMENT (OUTPATIENT)
Dept: NEPHROLOGY | Facility: CLINIC | Age: 85
End: 2020-12-17
Payer: MEDICARE

## 2020-12-17 PROCEDURE — 99443: CPT | Mod: 95

## 2020-12-17 NOTE — ASSESSMENT
[FreeTextEntry1] : # HTN controlled.\par * A counseling information sheet has been given (currently or previously, in-person or electronically). All their questions were answered.\par * The patient has been counseled to check their BP at home with an automatic arm cuff, write down the readings, and reach me directly on the phone immediately if they are persistently > 180 systolic or if SBP is less than 100 or if lightheadedness develops. They were counseled to bring in all blood pressure readings and medications next visit.\par * The patient has been counseled that they have a a significant medical condition and regular office followup (at least every 2 months for now) is essential for monitoring, and that it is their responsibility to make follow up appointments.\par * The patient also has been counseled that they must never stop or change any medications without discussing this with me (or another physician). 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 All their questions were answered.\par * The patient has been counseled never to stop taking their medications without discussing it with me or another doctor.\par * The patient has been counseled on risk of acute renal failure and instructed to immediately call and speak with me or go immediately to ER with any severe symptoms, nausea, vomiting, diarrhea, chest pain, or shortness of breath.\par * The patient has been counseled on avoiding NSAIDs.\par \par # Possible primary hyperparathyroidism and hypercalcemia. \par * Next visit, recheck labs, including ionized calcium and PTH.\par * Risks of parathyroidectomy may outweigh benefits. He will avoid calcium/vitamin D. \par

## 2020-12-17 NOTE — HISTORY OF PRESENT ILLNESS
[Home] : at home, [unfilled] , at the time of the visit. [Medical Office: ([facility])___] : at the medical office located in  [Spouse] : spouse [Verbal consent obtained from patient] : the patient, [unfilled] [FreeTextEntry1] : He was on the phone with his wife who was also provided counseling* Labs checked 1 month ago. CKD stable, creatinine 1.8. * Calcium stable at 10.2, no ca/vit d or MVI. *  BP 110s off lisinopril. On eliquis. \par \par Previous history (78Vkl73): * Events reviewed. Underwent inguinal hernia repair complicated by wound infection. Now afebrile, no pain, off antibiotics. * Labs from 9/17 reviewed: creatinine 1.8, calcium 10.8. No calcium or vitamin D. * BP controlled, Lisinopril had been stopped during hospitalization. \par \par Previous history (11Aug20): * HTN controlled. No lightheadedness. Lisinopril decreased to 2.5 by cardiologist. * CKD stable, creatinine 1.81. * Hypercalcium improved to 9.7. Previous . \par \par *Previous history (03Feb20):  CKD stable, creatinine 1.7 on Jan 21. * HTN controlled. No lightheadedness. Compliant with medications. * Calcium 10.3 with . * No family history of aneurysm or stroke. Slight headache last week which resolved. \par \par Previous history (10Oct19): * CKD slowly progressive, creatinine 1.84 on [id number]. * HTN controlled. No lightheadedness. Compliant with medications. * Calcium decreased to 10. . \par \par Previous history (11Jun19): * Labs reviewed from 5/28. CKD stable, creatinine 1.65. * HTN controlled. No lightheadedness. Compliant with medications. Lisinopril had been increased to 10 mg.* In April 2019 he had a CVA and was started on eliquis. This was not reportedly caused by a berry aneurysm. He has slight aphasia. \par \par Previous history (48Swp39): * Labs from [facility] reviewed. Creatinine 1.62 in 11/18. * Borderline hypercalcemia, calcium 10.5. 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 * HTN controlled. No lightheadedness. Compliant with medications. Following low salt diet.\par \par Previous history (47Yct35): Referred by Dr. Gardiner for CKD. Found to have creatinine of 1.5 (GFR 43) on October 24, 2017. No NSAIDS. 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 A CT scan in 2015 revealed bilateral enlarged kidneys with innumerable cysts. His uncles (father's side) and nieces have had a similar issue. He is unsure whether his father had kidney disease. * His PTH was 94 with a calcium of 10.8 and he is being evaluated by Dr. Gardiner and Dr. Cope. HTN controlled on lisinopril 20. No lightheadedness. \par \par PCP: Dr. Chavez Lopez

## 2021-05-05 ENCOUNTER — APPOINTMENT (OUTPATIENT)
Dept: NEPHROLOGY | Facility: CLINIC | Age: 86
End: 2021-05-05
Payer: MEDICARE

## 2021-05-05 VITALS — TEMPERATURE: 97.7 F | BODY MASS INDEX: 26 KG/M2 | WEIGHT: 171 LBS

## 2021-05-05 VITALS — DIASTOLIC BLOOD PRESSURE: 69 MMHG | HEART RATE: 52 BPM | SYSTOLIC BLOOD PRESSURE: 132 MMHG

## 2021-05-05 PROCEDURE — 99214 OFFICE O/P EST MOD 30 MIN: CPT | Mod: 25

## 2021-05-05 PROCEDURE — 36415 COLL VENOUS BLD VENIPUNCTURE: CPT

## 2021-05-05 NOTE — HISTORY OF PRESENT ILLNESS
[FreeTextEntry1] : PCP: Dr. Rikki Chappell\par \par * He is here with his wife who was also provided counseling. A separate  was offered and they declined.  * BP 120s - 130s at home. * CKD stable, creatinine 1.86. * Calcium 9.9.  \par \par Previous history (64Dud59): He was on the phone with his wife who was also provided counseling* Labs checked 1 month ago. CKD stable, creatinine 1.8. * Calcium stable at 10.2, no ca/vit d or MVI. *  BP 110s off lisinopril. On eliquis. \par \par Previous history (26Rha31): * Events reviewed. Underwent inguinal hernia repair complicated by wound infection. Now afebrile, no pain, off antibiotics. * Labs from 9/17 reviewed: creatinine 1.8, calcium 10.8. No calcium or vitamin D. * BP controlled, Lisinopril had been stopped during hospitalization. \par \par Previous history (11Aug20): * HTN controlled. No lightheadedness. Lisinopril decreased to 2.5 by cardiologist. * CKD stable, creatinine 1.81. * Hypercalcium improved to 9.7. Previous . \par \par *Previous history (96Srz62):  CKD stable, creatinine 1.7 on Jan 21. * HTN controlled. No lightheadedness. Compliant with medications. * Calcium 10.3 with . * No family history of aneurysm or stroke. Slight headache last week which resolved. \par \par Previous history (10Oct19): * CKD slowly progressive, creatinine 1.84 on [id number]. * HTN controlled. No lightheadedness. Compliant with medications. * Calcium decreased to 10. . \par \par Previous history (11Jun19): * Labs reviewed from 5/28. CKD stable, creatinine 1.65. * HTN controlled. No lightheadedness. Compliant with medications. Lisinopril had been increased to 10 mg.* In April 2019 he had a CVA and was started on eliquis. This was not reportedly caused by a berry aneurysm. He has slight aphasia. \par \par Previous history (25Jvi07): * Labs from [facility] reviewed. Creatinine 1.62 in 11/18. * Borderline hypercalcemia, calcium 10.5. 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 * HTN controlled. No lightheadedness. Compliant with medications. Following low salt diet.\par \par Previous history (97Puv82): Referred by Dr. Gardiner for CKD. Found to have creatinine of 1.5 (GFR 43) on October 24, 2017. No NSAIDS. 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 A CT scan in 2015 revealed bilateral enlarged kidneys with innumerable cysts. His uncles (father's side) and nieces have had a similar issue. He is unsure whether his father had kidney disease. * His PTH was 94 with a calcium of 10.8 and he is being evaluated by Dr. Gardiner and Dr. Cope. HTN controlled on lisinopril 20. No lightheadedness. \par \par PCP: Dr. Chavez Lopez

## 2021-05-05 NOTE — ASSESSMENT
[FreeTextEntry1] : # HTN uncontrolled.\par * Start amlodipine 2.5. \par * The patient's blood pressure was checked with the [employer] [id number]-[id number] using the SPRINT trial protocol after sitting quietly in an empty room with arm supported, back supported, and feet on the floor for 5 minutes. The average of 3 readings were taken.\par * A counseling information sheet has been given (currently or previously, in-person or electronically). All their questions were answered.\par * The patient has been counseled to check their BP at home with an automatic arm cuff, write down the readings, and reach me directly on the phone immediately if they are persistently > 180 systolic or if SBP is less than 100 or if lightheadedness develops. They were counseled to bring in all blood pressure readings and medications next visit.\par * The patient has been counseled that regular office followup (at least every 3 months for now)  is important for monitoring and for their health, and that it is their responsibility to make follow up appointments.\par * The patient also has been counseled that they must never stop or change any medications without discussing this with me (or another physician). 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 Written informed consent obtained.\par * Therapies for kidney disease: blood pressure control; other evidence-based therapies including exercise, a plant-based lower oxalate diet, and 400 mcg folic acid daily\par * Cardiovascular disease prevention: counseling on healthy diet, physical activity, weight loss, alcohol limitation, blood pressure control; moderate intensity statin therapy\par * The patient has been counseled that chronic kidney disease is a significant condition and regular office followup with me (at least every 3 months for now) is important for monitoring and their health, and that it is their responsibility to make a follow up appointment.\par * The patient has been counseled never to stop taking their medications without discussing it with me or another doctor.\par * The patient has been counseled on avoiding NSAIDs.\par * The patient has been counseled on risk of acute renal failure and instructed to immediately call and speak with me or go immediately to ER with any severe symptoms, nausea, vomiting, diarrhea, chest pain, or shortness of breath.\par * A counseling information sheet has been given (today or previously). All their questions were answered.\par \par # Possible hypercalcemia and hyperpth.\par * Recheck labs, including ionized calcium and PTH.\par * Risks of parathyroidectomy may outweigh benefits. He will avoid calcium/vitamin D.

## 2021-05-07 LAB
ALBUMIN SERPL ELPH-MCNC: 4.4 G/DL
ALP BLD-CCNC: 112 U/L
ALT SERPL-CCNC: 21 U/L
ANION GAP SERPL CALC-SCNC: 13 MMOL/L
AST SERPL-CCNC: 22 U/L
BASOPHILS # BLD AUTO: 0.03 K/UL
BASOPHILS NFR BLD AUTO: 0.4 %
BILIRUB SERPL-MCNC: 0.4 MG/DL
BUN SERPL-MCNC: 51 MG/DL
CA-I SERPL-SCNC: 1.37 MMOL/L
CALCIUM SERPL-MCNC: 10.4 MG/DL
CALCIUM SERPL-MCNC: 10.4 MG/DL
CHLORIDE SERPL-SCNC: 106 MMOL/L
CO2 SERPL-SCNC: 21 MMOL/L
CREAT SERPL-MCNC: 1.88 MG/DL
CREAT SPEC-SCNC: 90 MG/DL
EOSINOPHIL # BLD AUTO: 0.21 K/UL
EOSINOPHIL NFR BLD AUTO: 3 %
GLUCOSE SERPL-MCNC: 96 MG/DL
HCT VFR BLD CALC: 38.9 %
HGB BLD-MCNC: 12.3 G/DL
IMM GRANULOCYTES NFR BLD AUTO: 0.1 %
LYMPHOCYTES # BLD AUTO: 1.67 K/UL
LYMPHOCYTES NFR BLD AUTO: 24.2 %
MAN DIFF?: NORMAL
MCHC RBC-ENTMCNC: 29.5 PG
MCHC RBC-ENTMCNC: 31.6 GM/DL
MCV RBC AUTO: 93.3 FL
MICROALBUMIN 24H UR DL<=1MG/L-MCNC: 10.3 MG/DL
MICROALBUMIN/CREAT 24H UR-RTO: 114 MG/G
MONOCYTES # BLD AUTO: 0.63 K/UL
MONOCYTES NFR BLD AUTO: 9.1 %
NEUTROPHILS # BLD AUTO: 4.36 K/UL
NEUTROPHILS NFR BLD AUTO: 63.2 %
PARATHYROID HORMONE INTACT: 124 PG/ML
PHOSPHATE SERPL-MCNC: 3 MG/DL
PLATELET # BLD AUTO: 202 K/UL
POTASSIUM SERPL-SCNC: 4.8 MMOL/L
PROT SERPL-MCNC: 7.1 G/DL
RBC # BLD: 4.17 M/UL
RBC # FLD: 13.4 %
SODIUM SERPL-SCNC: 140 MMOL/L
WBC # FLD AUTO: 6.91 K/UL

## 2021-06-23 NOTE — CONSULT NOTE ADULT - I WAS PHYSICALLY PRESENT FOR THE KEY PORTIONS OF THE EVALUATION AND MANAGEMENT (E/M) SERVICE PROVIDED.  I AGREE WITH THE ABOVE HISTORY, PHYSICAL, AND PLAN WHICH I HAVE REVIEWED AND EDITED WHERE APPROPRIATE
Final Anesthesia Post-op Assessment    Patient: Bartolo Viera  Procedure(s) Performed: W CASH  Anesthesia type: General    Vitals Value Taken Time   Temp 36 °C (96.8 °F) 06/23/21 1526   Pulse 90 06/23/21 1526   Resp 22 06/23/21 1526   SpO2 100 % 06/23/21 1526   /76 06/23/21 1528   Vitals shown include unvalidated device data.      Patient Location: ICU  Post-op Vital Signs:stable  Level of Consciousness: sedated  Respiratory Status: ETT  Cardiovascular vasoactive support  Hydration: euvolemic  Pain Management: well controlled  Handoff: Handoff to receiving clinician was performed and questions were answered  Vomiting: none  Nausea: None  Airway Patency:intubated  Post-op Assessment: no complications, patient tolerated procedure well with no complications and no evidence of recall      No complications documented.     Significant bleeding postop noted, now improved. Extubated without complication.  Blood Pressure 118/77 (BP Location: RUE - Right upper extremity, Patient Position: Semi-Reyes's)   Pulse 100   Temperature 37.1 °C (98.8 °F)   Respiration (Abnormal) 39   Height 6' 2.33\" (1.888 m)   Weight 133.3 kg   Body Mass Index 37.40 kg/m²   Body Surface Area 2.57 m²      Statement Selected

## 2021-08-06 ENCOUNTER — APPOINTMENT (OUTPATIENT)
Dept: NEPHROLOGY | Facility: CLINIC | Age: 86
End: 2021-08-06
Payer: MEDICARE

## 2021-08-06 VITALS — SYSTOLIC BLOOD PRESSURE: 116 MMHG | HEART RATE: 50 BPM | DIASTOLIC BLOOD PRESSURE: 64 MMHG

## 2021-08-06 DIAGNOSIS — Z87.448 PERSONAL HISTORY OF OTHER DISEASES OF URINARY SYSTEM: ICD-10-CM

## 2021-08-06 PROCEDURE — 36415 COLL VENOUS BLD VENIPUNCTURE: CPT

## 2021-08-06 PROCEDURE — 99214 OFFICE O/P EST MOD 30 MIN: CPT | Mod: 25

## 2021-08-06 NOTE — PHYSICAL EXAM
[General Appearance - Alert] : alert [General Appearance - In No Acute Distress] : in no acute distress [Neck Appearance] : the appearance of the neck was normal [Neck Cervical Mass (___cm)] : no neck mass was observed [Jugular Venous Distention Increased] : there was no jugular-venous distention [Thyroid Diffuse Enlargement] : the thyroid was not enlarged [Thyroid Nodule] : there were no palpable thyroid nodules [Heart Rate And Rhythm] : heart rate was normal and rhythm regular [Heart Sounds] : normal S1 and S2 [Heart Sounds Gallop] : no gallops [Murmurs] : no murmurs [Heart Sounds Pericardial Friction Rub] : no pericardial rub [Edema] : there was no peripheral edema [Abdomen Soft] : soft [Abdomen Tenderness] : non-tender [] : no hepato-splenomegaly [Abdomen Mass (___ Cm)] : no abdominal mass palpated [Cervical Lymph Nodes Enlarged Posterior Bilaterally] : posterior cervical [Cervical Lymph Nodes Enlarged Anterior Bilaterally] : anterior cervical [Supraclavicular Lymph Nodes Enlarged Bilaterally] : supraclavicular

## 2021-08-06 NOTE — HISTORY OF PRESENT ILLNESS
[FreeTextEntry1] : PCP: Dr. Rikki Flowers April 2021\par \par * CKD stable, creatinine 1.88, eGFR 32. * 114 mg albuminuria. * , high ionized calcium. * HTN controlled. No lightheadedness. Compliant with medications. * No ADPKD mutations identified on renasight. 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 \par \par Previous history (44Mad72): * He is here with his wife who was also provided counseling. A separate  was offered and they declined.  * BP 120s - 130s at home. * CKD stable, creatinine 1.86. * Calcium 9.9.  \par \par Previous history (83Ugz03): He was on the phone with his wife who was also provided counseling* Labs checked 1 month ago. CKD stable, creatinine 1.8. * Calcium stable at 10.2, no ca/vit d or MVI. *  BP 110s off lisinopril. On eliquis. \par \par Previous history (38Uzc62): * Events reviewed. Underwent inguinal hernia repair complicated by wound infection. Now afebrile, no pain, off antibiotics. * Labs from 9/17 reviewed: creatinine 1.8, calcium 10.8. No calcium or vitamin D. * BP controlled, Lisinopril had been stopped during hospitalization. \par \par Previous history (11Aug20): * HTN controlled. No lightheadedness. Lisinopril decreased to 2.5 by cardiologist. * CKD stable, creatinine 1.81. * Hypercalcium improved to 9.7. Previous . \par \par *Previous history (03Feb20):  CKD stable, creatinine 1.7 on Jan 21. * HTN controlled. No lightheadedness. Compliant with medications. * Calcium 10.3 with . * No family history of aneurysm or stroke. Slight headache last week which resolved. \par \par Previous history (10Oct19): * CKD slowly progressive, creatinine 1.84 on 92Ump28. * HTN controlled. No lightheadedness. Compliant with medications. * Calcium decreased to 10. . \par \par Previous history (11Jun19): * Labs reviewed from 5/28. CKD stable, creatinine 1.65. * HTN controlled. No lightheadedness. Compliant with medications. Lisinopril had been increased to 10 mg.* In April 2019 he had a CVA and was started on eliquis. This was not reportedly caused by a berry aneurysm. He has slight aphasia. \par \par Previous history (22Giu27): * Labs from [facility] reviewed. Creatinine 1.62 in 11/18. * Borderline hypercalcemia, calcium 10.5. 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 * HTN controlled. No lightheadedness. Compliant with medications. Following low salt diet.\par \par Previous history (88Xex93): Referred by Dr. Gardiner for CKD. Found to have creatinine of 1.5 (GFR 43) on October 24, 2017. No NSAIDS. 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 A CT scan in 2015 revealed bilateral enlarged kidneys with innumerable cysts. His uncles (father's side) and nieces have had a similar issue. He is unsure whether his father had kidney disease. * His PTH was 94 with a calcium of 10.8 and he is being evaluated by Dr. Gardiner and Dr. Cope. HTN controlled on lisinopril 20. No lightheadedness. \par \par PCP: Dr. Chavez Lopez

## 2021-08-06 NOTE — ASSESSMENT
[FreeTextEntry1] : # HTN controlled.\par * Continue amlodipine 2.5. \par * The patient's blood pressure was checked with the [employer] [id number]-[id number] using the SPRINT trial protocol after sitting quietly in an empty room with arm supported, back supported, and feet on the floor for 5 minutes. The average of 3 readings were taken.\par * A counseling information sheet has been given (currently or previously, in-person or electronically). All their questions were answered.\par * The patient has been counseled to check their BP at home with an automatic arm cuff, write down the readings, and reach me directly on the phone immediately if they are persistently > 180 systolic or if SBP is less than 100 or if lightheadedness develops. They were counseled to bring in all blood pressure readings and medications next visit.\par * The patient has been counseled that regular office followup (at least every 3 months for now) is important for monitoring and for their health, and that it is their responsibility to make follow up appointments.\par * The patient also has been counseled that they must never stop or change any medications without discussing this with me (or another physician). 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 \par * Recheck labs.\par * Cont statin.\par * Therapies for kidney disease: blood pressure control; other evidence-based therapies including exercise, a plant-based lower oxalate diet, and 400 mcg folic acid daily\par * Cardiovascular disease prevention: counseling on healthy diet, physical activity, weight loss, alcohol limitation, blood pressure control; moderate intensity statin therapy\par * The patient has been counseled that chronic kidney disease is a significant condition and regular office followup with me (at least every 3 months for now) is important for monitoring and their health, and that it is their responsibility to make a follow up appointment.\par * The patient has been counseled never to stop taking their medications without discussing it with me or another doctor.\par * The patient has been counseled on avoiding NSAIDs.\par * The patient has been counseled on risk of acute renal failure and instructed to immediately call and speak with me or go immediately to ER with any severe symptoms, nausea, vomiting, diarrhea, chest pain, or shortness of breath.\par * A counseling information sheet has been given (today or previously). All their questions were answered.\par \par # Possible hypercalcemia and hyperpth.\par * Follow calcium. \par * Risks of parathyroidectomy may outweigh benefits. He will avoid calcium/vitamin D.

## 2021-08-07 LAB
ALBUMIN SERPL ELPH-MCNC: 4.4 G/DL
ALP BLD-CCNC: 116 U/L
ALT SERPL-CCNC: 12 U/L
ANION GAP SERPL CALC-SCNC: 16 MMOL/L
AST SERPL-CCNC: 18 U/L
BASOPHILS # BLD AUTO: 0.03 K/UL
BASOPHILS NFR BLD AUTO: 0.5 %
BILIRUB SERPL-MCNC: 0.4 MG/DL
BUN SERPL-MCNC: 54 MG/DL
CALCIUM SERPL-MCNC: 10.6 MG/DL
CHLORIDE SERPL-SCNC: 108 MMOL/L
CO2 SERPL-SCNC: 21 MMOL/L
CREAT SERPL-MCNC: 1.87 MG/DL
CREAT SPEC-SCNC: 77 MG/DL
EOSINOPHIL # BLD AUTO: 0.29 K/UL
EOSINOPHIL NFR BLD AUTO: 4.5 %
GLUCOSE SERPL-MCNC: 74 MG/DL
HCT VFR BLD CALC: 39.7 %
HGB BLD-MCNC: 12.6 G/DL
IMM GRANULOCYTES NFR BLD AUTO: 0.5 %
LYMPHOCYTES # BLD AUTO: 1.67 K/UL
LYMPHOCYTES NFR BLD AUTO: 26 %
MAGNESIUM SERPL-MCNC: 2.2 MG/DL
MAN DIFF?: NORMAL
MCHC RBC-ENTMCNC: 29.8 PG
MCHC RBC-ENTMCNC: 31.7 GM/DL
MCV RBC AUTO: 93.9 FL
MICROALBUMIN 24H UR DL<=1MG/L-MCNC: 7.2 MG/DL
MICROALBUMIN/CREAT 24H UR-RTO: 94 MG/G
MONOCYTES # BLD AUTO: 0.56 K/UL
MONOCYTES NFR BLD AUTO: 8.7 %
NEUTROPHILS # BLD AUTO: 3.84 K/UL
NEUTROPHILS NFR BLD AUTO: 59.8 %
PHOSPHATE SERPL-MCNC: 3.2 MG/DL
PLATELET # BLD AUTO: 191 K/UL
POTASSIUM SERPL-SCNC: 4.5 MMOL/L
PROT SERPL-MCNC: 7.1 G/DL
RBC # BLD: 4.23 M/UL
RBC # FLD: 13.8 %
SODIUM SERPL-SCNC: 145 MMOL/L
WBC # FLD AUTO: 6.42 K/UL

## 2021-11-30 ENCOUNTER — APPOINTMENT (OUTPATIENT)
Dept: NEPHROLOGY | Facility: CLINIC | Age: 86
End: 2021-11-30
Payer: MEDICARE

## 2021-11-30 PROCEDURE — 99214 OFFICE O/P EST MOD 30 MIN: CPT | Mod: 95

## 2021-11-30 NOTE — HISTORY OF PRESENT ILLNESS
[Home] : at home, [unfilled] , at the time of the visit. [Medical Office: ([facility])___] : at the medical office located in  [Spouse] : spouse [Verbal consent obtained from patient] : the patient, [unfilled] [FreeTextEntry1] : PCP: Dr. Rikki Flowers April 2021\par \par * CKD stable, creatinbine 1.75. 114 mg albuminuria. * * HTN uncontrolled. BP 130s - 140s at home. No lightheadedness. Compliant with medications. * No ADPKD mutations identified on renasight. 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 \par \par Previous history (06Aug21):  * CKD stable, creatinine 1.88, eGFR 32. * 114 mg albuminuria. * , high ionized calcium. * HTN controlled. No lightheadedness. Compliant with medications. * No ADPKD mutations identified on renasight. 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 \par \par Previous history (05May21): * He is here with his wife who was also provided counseling. A separate  was offered and they declined.  * BP 120s - 130s at home. * CKD stable, creatinine 1.86. * Calcium 9.9.  \par \par Previous history (17Dec20): He was on the phone with his wife who was also provided counseling* Labs checked 1 month ago. CKD stable, creatinine 1.8. * Calcium stable at 10.2, no ca/vit d or MVI. *  BP 110s off lisinopril. On eliquis. \par \par Previous history (24Sep20): * Events reviewed. Underwent inguinal hernia repair complicated by wound infection. Now afebrile, no pain, off antibiotics. * Labs from 9/17 reviewed: creatinine 1.8, calcium 10.8. No calcium or vitamin D. * BP controlled, Lisinopril had been stopped during hospitalization. \par \par Previous history (11Aug20): * HTN controlled. No lightheadedness. Lisinopril decreased to 2.5 by cardiologist. * CKD stable, creatinine 1.81. * Hypercalcium improved to 9.7. Previous . \par \par *Previous history (03Feb20):  CKD stable, creatinine 1.7 on Jan 21. * HTN controlled. No lightheadedness. Compliant with medications. * Calcium 10.3 with . * No family history of aneurysm or stroke. Slight headache last week which resolved. \par \par Previous history (10Oct19): * CKD slowly progressive, creatinine 1.84 on [id number]. * HTN controlled. No lightheadedness. Compliant with medications. * Calcium decreased to 10. . \par \par Previous history (11Jun19): * Labs reviewed from 5/28. CKD stable, creatinine 1.65. * HTN controlled. No lightheadedness. Compliant with medications. Lisinopril had been increased to 10 mg.* In April 2019 he had a CVA and was started on eliquis. This was not reportedly caused by a berry aneurysm. He has slight aphasia. \par \par Previous history (63Hqy07): * Labs from [facility] reviewed. Creatinine 1.62 in 11/18. * Borderline hypercalcemia, calcium 10.5. 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 * HTN controlled. No lightheadedness. Compliant with medications. Following low salt diet.\par \par Previous history (32Yko42): Referred by Dr. Gardiner for CKD. Found to have creatinine of 1.5 (GFR 43) on October 24, 2017. No NSAIDS. 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 A CT scan in 2015 revealed bilateral enlarged kidneys with innumerable cysts. His uncles (father's side) and nieces have had a similar issue. He is unsure whether his father had kidney disease. * His PTH was 94 with a calcium of 10.8 and he is being evaluated by Dr. Gardiner and Dr. Cope. HTN controlled on lisinopril 20. No lightheadedness. \par \par PCP: Dr. Chavez Lopez

## 2021-11-30 NOTE — ASSESSMENT
[FreeTextEntry1] : # HTN uncontrolled. \par * Increase amlodipine to 5.  \par \par * A counseling information sheet has been given (currently or previously, in-person or electronically). All their questions were answered.\par * The patient has been counseled to check their BP at home with an automatic arm cuff, write down the readings, and reach me directly on the phone immediately if they are persistently > 180 systolic or if SBP is less than 100 or if lightheadedness develops. They were counseled to bring in all blood pressure readings and medications next visit.\par * The patient has been counseled that regular office followup (at least every 3 months for now) is important for monitoring and for their health, and that it is their responsibility to make follow up appointments.\par * The patient also has been counseled that they must never stop or change any medications without discussing this with me (or another physician). 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 \par * Recheck labs next visit. \par * Cont statin.\par * Therapies for kidney disease: blood pressure control; other evidence-based therapies including exercise, a plant-based lower oxalate diet, and 400 mcg folic acid daily\par * Cardiovascular disease prevention: counseling on healthy diet, physical activity, weight loss, alcohol limitation, blood pressure control; moderate intensity statin therapy\par * The patient has been counseled that chronic kidney disease is a significant condition and regular office followup with me (at least every 3 months for now) is important for monitoring and their health, and that it is their responsibility to make a follow up appointment.\par * The patient has been counseled never to stop taking their medications without discussing it with me or another doctor.\par * The patient has been counseled on avoiding NSAIDs.\par * The patient has been counseled on risk of acute renal failure and instructed to immediately call and speak with me or go immediately to ER with any severe symptoms, nausea, vomiting, diarrhea, chest pain, or shortness of breath.\par * A counseling information sheet has been given (today or previously). All their questions were answered.\par \par # Possible hypercalcemia and hyperpth.\par * Follow calcium. \par * Risks of parathyroidectomy may outweigh benefits. He will avoid calcium/vitamin D. \par \par

## 2022-04-15 ENCOUNTER — APPOINTMENT (OUTPATIENT)
Dept: NEPHROLOGY | Facility: CLINIC | Age: 87
End: 2022-04-15
Payer: MEDICARE

## 2022-04-15 VITALS — SYSTOLIC BLOOD PRESSURE: 116 MMHG | DIASTOLIC BLOOD PRESSURE: 59 MMHG | HEART RATE: 52 BPM

## 2022-04-15 PROCEDURE — 99214 OFFICE O/P EST MOD 30 MIN: CPT

## 2022-04-15 NOTE — ASSESSMENT
[FreeTextEntry1] : # HTN controlled\par * Cont amlodipine 5. \par * A counseling information sheet has been given (currently or previously, in-person or electronically). All their questions were answered.\par * The patient has been counseled to check their BP at home with an automatic arm cuff, write down the readings, and reach me directly on the phone immediately if they are persistently > 180 systolic or if SBP is less than 100 or if lightheadedness develops. They were counseled to bring in all blood pressure readings and medications next visit.\par * The patient has been counseled that regular office followup (at least every 3 months for now) is important for monitoring and for their health, and that it is their responsibility to make follow up appointments.\par * The patient also has been counseled that they must never stop or change any medications without discussing this with me (or another physician). 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 \par * Recheck labs.\par * Cont statin.\par * Therapies for kidney disease: blood pressure control; other evidence-based therapies including exercise, a plant-based lower oxalate diet, and 400 mcg folic acid daily\par * Cardiovascular disease prevention: counseling on healthy diet, physical activity, weight loss, alcohol limitation, blood pressure control; moderate intensity statin therapy\par * The patient has been counseled that chronic kidney disease is a significant condition and regular office followup with me (at least every 3 months for now) is important for monitoring and their health, and that it is their responsibility to make a follow up appointment.\par * The patient has been counseled never to stop taking their medications without discussing it with me or another doctor.\par * The patient has been counseled on avoiding NSAIDs.\par * The patient has been counseled on risk of acute renal failure and instructed to immediately call and speak with me or go immediately to ER with any severe symptoms, nausea, vomiting, diarrhea, chest pain, or shortness of breath.\par * A counseling information sheet has been given (today or previously). All their questions were answered.\par \par # Possible hypercalcemia and hyperpth.\par * Follow calcium. \par * Risks of parathyroidectomy may outweigh benefits. He will avoid calcium/vitamin D. \par

## 2022-04-15 NOTE — PHYSICAL EXAM
[General Appearance - Alert] : alert [General Appearance - In No Acute Distress] : in no acute distress [Neck Appearance] : the appearance of the neck was normal [Neck Cervical Mass (___cm)] : no neck mass was observed [Jugular Venous Distention Increased] : there was no jugular-venous distention [Thyroid Diffuse Enlargement] : the thyroid was not enlarged [Thyroid Nodule] : there were no palpable thyroid nodules [Auscultation Breath Sounds / Voice Sounds] : lungs were clear to auscultation bilaterally [Heart Rate And Rhythm] : heart rate was normal and rhythm regular [Heart Sounds] : normal S1 and S2 [Heart Sounds Gallop] : no gallops [Heart Sounds Pericardial Friction Rub] : no pericardial rub [Edema] : there was no peripheral edema [Abdomen Soft] : soft [Abdomen Tenderness] : non-tender [] : no hepato-splenomegaly [Abdomen Mass (___ Cm)] : no abdominal mass palpated [Cervical Lymph Nodes Enlarged Posterior Bilaterally] : posterior cervical [Cervical Lymph Nodes Enlarged Anterior Bilaterally] : anterior cervical [Supraclavicular Lymph Nodes Enlarged Bilaterally] : supraclavicular [FreeTextEntry1] : soft NIRALI

## 2022-04-15 NOTE — HISTORY OF PRESENT ILLNESS
[FreeTextEntry1] : PCP: Dr. Rikki Chappell Vaccinated  Moderna April 2021 Here with his wife. \par \par * CKD previously stable, creatinine 1.87. Labs from 22Feb22 reviewed: Creatinine 2.12. Calcium 10.1. * 94 mg albuminuria. * HTN bortderline controlled. Average 130s at home. No lightheadedness. No CP/SOB. Compliant with medications. \par \par Previous history (30Nov21): * CKD stable, creatinbine 1.75. 114 mg albuminuria. * * HTN uncontrolled. BP 130s - 140s at home. No lightheadedness. Compliant with medications. * No ADPKD mutations identified on renasight. 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 \par \par Previous history (06Aug21):  * CKD stable, creatinine 1.88, eGFR 32. * 114 mg albuminuria. * , high ionized calcium. * HTN controlled. No lightheadedness. Compliant with medications. * No ADPKD mutations identified on renasight. 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 \par \par Previous history (05May21): * He is here with his wife who was also provided counseling. A separate  was offered and they declined.  * BP 120s - 130s at home. * CKD stable, creatinine 1.86. * Calcium 9.9.  \par \par Previous history (17Dec20): He was on the phone with his wife who was also provided counseling* Labs checked 1 month ago. CKD stable, creatinine 1.8. * Calcium stable at 10.2, no ca/vit d or MVI. *  BP 110s off lisinopril. On eliquis. \par \par Previous history (24Sep20): * Events reviewed. Underwent inguinal hernia repair complicated by wound infection. Now afebrile, no pain, off antibiotics. * Labs from 9/17 reviewed: creatinine 1.8, calcium 10.8. No calcium or vitamin D. * BP controlled, Lisinopril had been stopped during hospitalization. \par \par Previous history (11Aug20): * HTN controlled. No lightheadedness. Lisinopril decreased to 2.5 by cardiologist. * CKD stable, creatinine 1.81. * Hypercalcium improved to 9.7. Previous . \par \par *Previous history (37Ixg12):  CKD stable, creatinine 1.7 on Jan 21. * HTN controlled. No lightheadedness. Compliant with medications. * Calcium 10.3 with . * No family history of aneurysm or stroke. Slight headache last week which resolved. \par \par Previous history (10Oct19): * CKD slowly progressive, creatinine 1.84 on [id number]. * HTN controlled. No lightheadedness. Compliant with medications. * Calcium decreased to 10. . \par \par Previous history (43Amo27): * Labs reviewed from 5/28. CKD stable, creatinine 1.65. * HTN controlled. No lightheadedness. Compliant with medications. Lisinopril had been increased to 10 mg.* In April 2019 he had a CVA and was started on eliquis. This was not reportedly caused by a berry aneurysm. He has slight aphasia. \par \par Previous history (58Bum05): * Labs from [facility] reviewed. Creatinine 1.62 in 11/18. * Borderline hypercalcemia, calcium 10.5. 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 * HTN controlled. No lightheadedness. Compliant with medications. Following low salt diet.\par \par Previous history (57Uff08): Referred by Dr. Gardiner for CKD. Found to have creatinine of 1.5 (GFR 43) on October 24, 2017. No NSAIDS. 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 A CT scan in 2015 revealed bilateral enlarged kidneys with innumerable cysts. His uncles (father's side) and nieces have had a similar issue. He is unsure whether his father had kidney disease. * His PTH was 94 with a calcium of 10.8 and he is being evaluated by Dr. Gardiner and Dr. Cope. HTN controlled on lisinopril 20. No lightheadedness. \par \par PCP: Dr. Chavez Lopez

## 2022-05-02 LAB
ALBUMIN SERPL ELPH-MCNC: 3.9 G/DL
ALP BLD-CCNC: 93 U/L
ALT SERPL-CCNC: 10 U/L
ANION GAP SERPL CALC-SCNC: 13 MMOL/L
AST SERPL-CCNC: 20 U/L
BASOPHILS # BLD AUTO: 0.04 K/UL
BASOPHILS NFR BLD AUTO: 0.7 %
BILIRUB SERPL-MCNC: 0.4 MG/DL
BUN SERPL-MCNC: 55 MG/DL
CALCIUM SERPL-MCNC: 10.3 MG/DL
CHLORIDE SERPL-SCNC: 108 MMOL/L
CK SERPL-CCNC: 76 U/L
CO2 SERPL-SCNC: 21 MMOL/L
CREAT SERPL-MCNC: 2.11 MG/DL
CYSTATIN C SERPL-MCNC: 2.31 MG/L
EGFR: 30 ML/MIN/1.73M2
EOSINOPHIL # BLD AUTO: 0.29 K/UL
EOSINOPHIL NFR BLD AUTO: 4.7 %
GFR/BSA.PRED SERPLBLD CYS-BASED-ARV: 23 ML/MIN/1.73M2
GLUCOSE SERPL-MCNC: 76 MG/DL
HCT VFR BLD CALC: 34.9 %
HGB BLD-MCNC: 11.4 G/DL
IMM GRANULOCYTES NFR BLD AUTO: 0.3 %
LYMPHOCYTES # BLD AUTO: 1.51 K/UL
LYMPHOCYTES NFR BLD AUTO: 24.6 %
MAGNESIUM SERPL-MCNC: 2.3 MG/DL
MAN DIFF?: NORMAL
MCHC RBC-ENTMCNC: 29.5 PG
MCHC RBC-ENTMCNC: 32.7 GM/DL
MCV RBC AUTO: 90.4 FL
MONOCYTES # BLD AUTO: 0.84 K/UL
MONOCYTES NFR BLD AUTO: 13.7 %
NEUTROPHILS # BLD AUTO: 3.43 K/UL
NEUTROPHILS NFR BLD AUTO: 56 %
PLATELET # BLD AUTO: 160 K/UL
POTASSIUM SERPL-SCNC: 5 MMOL/L
PROT SERPL-MCNC: 6.4 G/DL
RBC # BLD: 3.86 M/UL
RBC # FLD: 13.5 %
SODIUM SERPL-SCNC: 142 MMOL/L
WBC # FLD AUTO: 6.13 K/UL

## 2022-07-13 ENCOUNTER — TRANSCRIPTION ENCOUNTER (OUTPATIENT)
Age: 87
End: 2022-07-13

## 2022-07-15 ENCOUNTER — APPOINTMENT (OUTPATIENT)
Dept: NEPHROLOGY | Facility: CLINIC | Age: 87
End: 2022-07-15

## 2022-07-15 ENCOUNTER — LABORATORY RESULT (OUTPATIENT)
Age: 87
End: 2022-07-15

## 2022-07-15 VITALS — HEART RATE: 51 BPM | SYSTOLIC BLOOD PRESSURE: 114 MMHG | DIASTOLIC BLOOD PRESSURE: 64 MMHG

## 2022-07-15 DIAGNOSIS — M81.0 AGE-RELATED OSTEOPOROSIS W/OUT CURRENT PATHOLOGICAL FRACTURE: ICD-10-CM

## 2022-07-15 PROCEDURE — 36415 COLL VENOUS BLD VENIPUNCTURE: CPT

## 2022-07-15 PROCEDURE — 99214 OFFICE O/P EST MOD 30 MIN: CPT | Mod: 25

## 2022-07-15 NOTE — ASSESSMENT
[FreeTextEntry1] : # HTN controlled.\par * Cont amlodipine.\par * The patient's blood pressure was checked with the [employer] [id number]-907XL using the SPRINT trial protocol after sitting quietly in an empty room with arm supported, back supported, and feet on the floor for 5 minutes. The average of 3 readings were taken.\par * A counseling information sheet has been given. All their questions were answered.\par * The patient has been counseled to check their BP at home with an automatic arm cuff, write down the readings, and reach me directly on the phone immediately if they are persistently > 180 systolic or if SBP is less than 100 or if lightheadedness develops. They were counseled to bring in all blood pressure readings and medications next visit.\par * The patient has been counseled that regular office followup (at least every 4 months for now)  is important for monitoring and for their health, and that it is their responsibility to make follow up appointments.\par * The patient also has been counseled that they must never stop or change any medications without discussing this with me (or another physician). 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 All their questions were answered.\par * The patient has been counseled that chronic kidney disease is a significant condition and regular office followup with me (at least every 4 months for now) is important for monitoring and their health, and that it is their responsibility to make a follow up appointment.\par * The patient has been counseled never to stop taking their medications without discussing it with me or another doctor.\par * The patient has been counseled on avoiding NSAIDs.\par * The patient has been counseled on risk of acute renal failure and instructed to immediately call and speak with me or go immediately to ER with any severe symptoms, nausea, vomiting, diarrhea, chest pain, or shortness of breath.\par \par # Possible hypercalcemia and hyperpth.\par * Follow calcium. \par * Risks of parathyroidectomy may outweigh benefits. He will avoid calcium/vitamin D. \par \par

## 2022-07-15 NOTE — HISTORY OF PRESENT ILLNESS
[FreeTextEntry1] : PCP: Dr. Rikki Chappell Vaccinated  Moderna April 2021 Here with his wife. \par \par * HTN controlled. No lightheadedness. No CP/SOB. Compliant with medications. * CKD stable, creatinine 2.11. * Hypercalcemia stable. \par \par Previous history (15Apr22): * CKD previously stable, creatinine 1.87. Labs from 22Feb22 reviewed: Creatinine 2.12. Calcium 10.1. * 94 mg albuminuria. * HTN bortderline controlled. Average 130s at home. No lightheadedness. No CP/SOB. Compliant with medications. \par \par Previous history (30Nov21): * CKD stable, creatinbine 1.75. 114 mg albuminuria. * * HTN uncontrolled. BP 130s - 140s at home. No lightheadedness. Compliant with medications. * No ADPKD mutations identified on renasight. 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 \par \par Previous history (06Aug21):  * CKD stable, creatinine 1.88, eGFR 32. * 114 mg albuminuria. * , high ionized calcium. * HTN controlled. No lightheadedness. Compliant with medications. * No ADPKD mutations identified on renasight. 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 \par \par Previous history (05May21): * He is here with his wife who was also provided counseling. A separate  was offered and they declined.  * BP 120s - 130s at home. * CKD stable, creatinine 1.86. * Calcium 9.9.  \par \par Previous history (26Lym75): He was on the phone with his wife who was also provided counseling* Labs checked 1 month ago. CKD stable, creatinine 1.8. * Calcium stable at 10.2, no ca/vit d or MVI. *  BP 110s off lisinopril. On eliquis. \par \par Previous history (95Tka34): * Events reviewed. Underwent inguinal hernia repair complicated by wound infection. Now afebrile, no pain, off antibiotics. * Labs from 9/17 reviewed: creatinine 1.8, calcium 10.8. No calcium or vitamin D. * BP controlled, Lisinopril had been stopped during hospitalization. \par \par Previous history (11Aug20): * HTN controlled. No lightheadedness. Lisinopril decreased to 2.5 by cardiologist. * CKD stable, creatinine 1.81. * Hypercalcium improved to 9.7. Previous . \par \par *Previous history (20Fox26):  CKD stable, creatinine 1.7 on Jan 21. * HTN controlled. No lightheadedness. Compliant with medications. * Calcium 10.3 with . * No family history of aneurysm or stroke. Slight headache last week which resolved. \par \par Previous history (10Oct19): * CKD slowly progressive, creatinine 1.84 on 66Jbm15. * HTN controlled. No lightheadedness. Compliant with medications. * Calcium decreased to 10. . \par \par Previous history (43Jjm68): * Labs reviewed from 5/28. CKD stable, creatinine 1.65. * HTN controlled. No lightheadedness. Compliant with medications. Lisinopril had been increased to 10 mg.* In April 2019 he had a CVA and was started on eliquis. This was not reportedly caused by a berry aneurysm. He has slight aphasia. \par \par Previous history (27Wig75): * Labs from [facility] reviewed. Creatinine 1.62 in 11/18. * Borderline hypercalcemia, calcium 10.5. 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 * HTN controlled. No lightheadedness. Compliant with medications. Following low salt diet.\par \par Previous history (89Xcy23): Referred by Dr. Gardiner for CKD. Found to have creatinine of 1.5 (GFR 43) on October 24, 2017. No NSAIDS. 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 A CT scan in 2015 revealed bilateral enlarged kidneys with innumerable cysts. His uncles (father's side) and nieces have had a similar issue. He is unsure whether his father had kidney disease. * His PTH was 94 with a calcium of 10.8 and he is being evaluated by Dr. Gardiner and Dr. Cope. HTN controlled on lisinopril 20. No lightheadedness. \par \par PCP: Dr. Chavez Lopez

## 2022-07-17 LAB
25(OH)D3 SERPL-MCNC: 39.5 NG/ML
ALBUMIN SERPL ELPH-MCNC: 4.1 G/DL
ALP BLD-CCNC: 99 U/L
ALT SERPL-CCNC: 15 U/L
ANION GAP SERPL CALC-SCNC: 15 MMOL/L
APPEARANCE: CLEAR
AST SERPL-CCNC: 22 U/L
BASOPHILS # BLD AUTO: 0.03 K/UL
BASOPHILS NFR BLD AUTO: 0.6 %
BILIRUB SERPL-MCNC: 0.4 MG/DL
BILIRUBIN URINE: NEGATIVE
BLOOD URINE: NEGATIVE
BUN SERPL-MCNC: 54 MG/DL
CALCIUM SERPL-MCNC: 10.4 MG/DL
CALCIUM SERPL-MCNC: 10.4 MG/DL
CHLORIDE SERPL-SCNC: 107 MMOL/L
CHOLEST SERPL-MCNC: 114 MG/DL
CO2 SERPL-SCNC: 20 MMOL/L
COLOR: NORMAL
CREAT SERPL-MCNC: 2.71 MG/DL
CREAT SPEC-SCNC: 66 MG/DL
CREAT SPEC-SCNC: 66 MG/DL
CREAT/PROT UR: 0.5 RATIO
CYSTATIN C SERPL-MCNC: 2.62 MG/L
EGFR: 22 ML/MIN/1.73M2
EOSINOPHIL # BLD AUTO: 0.25 K/UL
EOSINOPHIL NFR BLD AUTO: 4.8 %
ESTIMATED AVERAGE GLUCOSE: 100 MG/DL
FERRITIN SERPL-MCNC: 227 NG/ML
GFR/BSA.PRED SERPLBLD CYS-BASED-ARV: 20 ML/MIN/1.73M2
GLUCOSE QUALITATIVE U: NEGATIVE
GLUCOSE SERPL-MCNC: 88 MG/DL
HBA1C MFR BLD HPLC: 5.1 %
HCT VFR BLD CALC: 33.7 %
HDLC SERPL-MCNC: 27 MG/DL
HGB BLD-MCNC: 10.9 G/DL
IMM GRANULOCYTES NFR BLD AUTO: 0 %
KETONES URINE: NEGATIVE
LDLC SERPL CALC-MCNC: 65 MG/DL
LEUKOCYTE ESTERASE URINE: ABNORMAL
LYMPHOCYTES # BLD AUTO: 1.26 K/UL
LYMPHOCYTES NFR BLD AUTO: 24.3 %
MAGNESIUM SERPL-MCNC: 2.4 MG/DL
MAN DIFF?: NORMAL
MCHC RBC-ENTMCNC: 30.4 PG
MCHC RBC-ENTMCNC: 32.3 GM/DL
MCV RBC AUTO: 93.9 FL
MICROALBUMIN 24H UR DL<=1MG/L-MCNC: 8.7 MG/DL
MICROALBUMIN/CREAT 24H UR-RTO: 131 MG/G
MONOCYTES # BLD AUTO: 0.45 K/UL
MONOCYTES NFR BLD AUTO: 8.7 %
NEUTROPHILS # BLD AUTO: 3.2 K/UL
NEUTROPHILS NFR BLD AUTO: 61.6 %
NITRITE URINE: NEGATIVE
NONHDLC SERPL-MCNC: 88 MG/DL
PARATHYROID HORMONE INTACT: 99 PG/ML
PH URINE: 6.5
PHOSPHATE SERPL-MCNC: 3.2 MG/DL
PLATELET # BLD AUTO: 150 K/UL
POTASSIUM SERPL-SCNC: 4.8 MMOL/L
PROT SERPL-MCNC: 6.9 G/DL
PROT UR-MCNC: 32 MG/DL
PROTEIN URINE: NORMAL
RBC # BLD: 3.59 M/UL
RBC # FLD: 13.1 %
SODIUM SERPL-SCNC: 142 MMOL/L
SPECIFIC GRAVITY URINE: 1.01
TRIGL SERPL-MCNC: 112 MG/DL
TSH SERPL-ACNC: 3.73 UIU/ML
UROBILINOGEN URINE: NORMAL
VIT B12 SERPL-MCNC: 551 PG/ML
WBC # FLD AUTO: 5.19 K/UL

## 2022-09-12 ENCOUNTER — RX RENEWAL (OUTPATIENT)
Age: 87
End: 2022-09-12

## 2022-12-28 ENCOUNTER — TRANSCRIPTION ENCOUNTER (OUTPATIENT)
Age: 87
End: 2022-12-28

## 2022-12-30 ENCOUNTER — APPOINTMENT (OUTPATIENT)
Dept: NEPHROLOGY | Facility: CLINIC | Age: 87
End: 2022-12-30
Payer: MEDICARE

## 2022-12-30 ENCOUNTER — LABORATORY RESULT (OUTPATIENT)
Age: 87
End: 2022-12-30

## 2022-12-30 VITALS — DIASTOLIC BLOOD PRESSURE: 70 MMHG | HEART RATE: 56 BPM | SYSTOLIC BLOOD PRESSURE: 152 MMHG

## 2022-12-30 DIAGNOSIS — E83.52 HYPERCALCEMIA: ICD-10-CM

## 2022-12-30 DIAGNOSIS — Z79.899 OTHER LONG TERM (CURRENT) DRUG THERAPY: ICD-10-CM

## 2022-12-30 DIAGNOSIS — R68.89 OTHER GENERAL SYMPTOMS AND SIGNS: ICD-10-CM

## 2022-12-30 PROCEDURE — G0008: CPT

## 2022-12-30 PROCEDURE — 90662 IIV NO PRSV INCREASED AG IM: CPT

## 2022-12-30 PROCEDURE — 99214 OFFICE O/P EST MOD 30 MIN: CPT | Mod: 25

## 2022-12-30 NOTE — ASSESSMENT
[FreeTextEntry1] : # HTN controlled with white coat effect. \par * Cont amlodipine.\par * The patient's blood pressure was checked with the [employer] HEM-907XL using the SPRINT trial protocol after sitting quietly in an empty room with arm supported, back supported, and feet on the floor for 5 minutes. The average of 3 readings were taken.\par * A counseling information sheet has been given. All their questions were answered.\par * The patient has been counseled to check their BP at home with an automatic arm cuff, write down the readings, and reach me directly on the phone immediately if they are persistently > 180 systolic or if SBP is less than 100 or if lightheadedness develops. They were counseled to bring in all blood pressure readings and medications next visit.\par * The patient has been counseled that regular office followup (at least every 4 months for now) is important for monitoring and for their health, and that it is their responsibility to make follow up appointments.\par * The patient also has been counseled that they must never stop or change any medications without discussing this with me (or another physician). 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 All their questions were answered.\par * The patient has been counseled that chronic kidney disease is a significant condition and regular office followup with me (at least every 4 months for now) is important for monitoring and their health, and that it is their responsibility to make a follow up appointment.\par * The patient has been counseled never to stop taking their medications without discussing it with me or another doctor.\par * The patient has been counseled on avoiding NSAIDs.\par * The patient has been counseled on risk of acute renal failure and instructed to immediately call and speak with me or go immediately to ER with any severe symptoms, nausea, vomiting, diarrhea, chest pain, or shortness of breath.\par \par # Possible hypercalcemia and hyperpth.\par * Follow calcium. \par * Risks of parathyroidectomy may outweigh benefits. He will avoid calcium/vitamin D. \par \par

## 2022-12-30 NOTE — HISTORY OF PRESENT ILLNESS
[FreeTextEntry1] : PCP: Dr. Rikki Chappell Vaccinated  Moderna April 2021 Here with his wife. \par \par * HTN controlled.  at home. No lightheadedness. No CP/SOB. Compliant with medications. * CKD stable, eGFR 21. Cr 2.71. \par \par Previous history (94Zkl90): * HTN controlled. No lightheadedness. No CP/SOB. Compliant with medications. * CKD stable, creatinine 2.11. * Hypercalcemia stable. \par \par Previous history (15Apr22): * CKD previously stable, creatinine 1.87. Labs from 22Feb22 reviewed: Creatinine 2.12. Calcium 10.1. * 94 mg albuminuria. * HTN bortderline controlled. Average 130s at home. No lightheadedness. No CP/SOB. Compliant with medications. \par \par Previous history (30Nov21): * CKD stable, creatinbine 1.75. 114 mg albuminuria. * * HTN uncontrolled. BP 130s - 140s at home. No lightheadedness. Compliant with medications. * No ADPKD mutations identified on renasight. 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 \par \par Previous history (85Jol30):  * CKD stable, creatinine 1.88, eGFR 32. * 114 mg albuminuria. * , high ionized calcium. * HTN controlled. No lightheadedness. Compliant with medications. * No ADPKD mutations identified on renasight. 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 \par \par Previous history (88Yzs51): * He is here with his wife who was also provided counseling. A separate  was offered and they declined.  * BP 120s - 130s at home. * CKD stable, creatinine 1.86. * Calcium 9.9.  \par \par Previous history (25Lmd05): He was on the phone with his wife who was also provided counseling* Labs checked 1 month ago. CKD stable, creatinine 1.8. * Calcium stable at 10.2, no ca/vit d or MVI. *  BP 110s off lisinopril. On eliquis. \par \par Previous history (47Bsc31): * Events reviewed. Underwent inguinal hernia repair complicated by wound infection. Now afebrile, no pain, off antibiotics. * Labs from 9/17 reviewed: creatinine 1.8, calcium 10.8. No calcium or vitamin D. * BP controlled, Lisinopril had been stopped during hospitalization. \par \par Previous history (11Aug20): * HTN controlled. No lightheadedness. Lisinopril decreased to 2.5 by cardiologist. * CKD stable, creatinine 1.81. * Hypercalcium improved to 9.7. Previous . \par \par *Previous history (25Yvl14):  CKD stable, creatinine 1.7 on Jan 21. * HTN controlled. No lightheadedness. Compliant with medications. * Calcium 10.3 with . * No family history of aneurysm or stroke. Slight headache last week which resolved. \par \par Previous history (10Oct19): * CKD slowly progressive, creatinine 1.84 on 21Agv69. * HTN controlled. No lightheadedness. Compliant with medications. * Calcium decreased to 10. . \par \par Previous history (11Jun19): * Labs reviewed from 5/28. CKD stable, creatinine 1.65. * HTN controlled. No lightheadedness. Compliant with medications. Lisinopril had been increased to 10 mg.* In April 2019 he had a CVA and was started on eliquis. This was not reportedly caused by a berry aneurysm. He has slight aphasia. \par \par Previous history (44Yoh75): * Labs from [facility] reviewed. Creatinine 1.62 in 11/18. * Borderline hypercalcemia, calcium 10.5. 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 * HTN controlled. No lightheadedness. Compliant with medications. Following low salt diet.\par \par Previous history (94Ean99): Referred by Dr. Gardiner for CKD. Found to have creatinine of 1.5 (GFR 43) on October 24, 2017. No NSAIDS. 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 A CT scan in 2015 revealed bilateral enlarged kidneys with innumerable cysts. His uncles (father's side) and nieces have had a similar issue. He is unsure whether his father had kidney disease. * His PTH was 94 with a calcium of 10.8 and he is being evaluated by Dr. Gardiner and Dr. Cope. HTN controlled on lisinopril 20. No lightheadedness. \par \par PCP: Dr. Chavez Lopez

## 2022-12-31 LAB
25(OH)D3 SERPL-MCNC: 39.1 NG/ML
ALBUMIN SERPL ELPH-MCNC: 4.4 G/DL
ALP BLD-CCNC: 102 U/L
ALT SERPL-CCNC: 14 U/L
ANION GAP SERPL CALC-SCNC: 14 MMOL/L
APPEARANCE: CLEAR
AST SERPL-CCNC: 17 U/L
BASOPHILS # BLD AUTO: 0.04 K/UL
BASOPHILS NFR BLD AUTO: 0.5 %
BILIRUB SERPL-MCNC: 0.5 MG/DL
BILIRUBIN URINE: NEGATIVE
BLOOD URINE: NEGATIVE
BUN SERPL-MCNC: 62 MG/DL
CALCIUM SERPL-MCNC: 10.7 MG/DL
CALCIUM SERPL-MCNC: 10.7 MG/DL
CHLORIDE SERPL-SCNC: 107 MMOL/L
CHOLEST SERPL-MCNC: 121 MG/DL
CO2 SERPL-SCNC: 22 MMOL/L
COLOR: NORMAL
CREAT SERPL-MCNC: 2.75 MG/DL
CREAT SPEC-SCNC: 78 MG/DL
CREAT SPEC-SCNC: 78 MG/DL
CREAT/PROT UR: 0.8 RATIO
EGFR: 22 ML/MIN/1.73M2
EOSINOPHIL # BLD AUTO: 0.34 K/UL
EOSINOPHIL NFR BLD AUTO: 4.4 %
FERRITIN SERPL-MCNC: 220 NG/ML
GLUCOSE QUALITATIVE U: NEGATIVE
GLUCOSE SERPL-MCNC: 87 MG/DL
HBA1C MFR BLD HPLC: NORMAL
HCT VFR BLD CALC: 35 %
HDLC SERPL-MCNC: 35 MG/DL
HGB BLD-MCNC: 11.2 G/DL
IMM GRANULOCYTES NFR BLD AUTO: 0.3 %
KETONES URINE: NEGATIVE
LDLC SERPL CALC-MCNC: 70 MG/DL
LEUKOCYTE ESTERASE URINE: NEGATIVE
LYMPHOCYTES # BLD AUTO: 1.79 K/UL
LYMPHOCYTES NFR BLD AUTO: 23.2 %
MAGNESIUM SERPL-MCNC: 2.2 MG/DL
MAN DIFF?: NORMAL
MCHC RBC-ENTMCNC: 30.4 PG
MCHC RBC-ENTMCNC: 32 GM/DL
MCV RBC AUTO: 94.9 FL
MICROALBUMIN 24H UR DL<=1MG/L-MCNC: 28.8 MG/DL
MICROALBUMIN/CREAT 24H UR-RTO: 369 MG/G
MONOCYTES # BLD AUTO: 0.78 K/UL
MONOCYTES NFR BLD AUTO: 10.1 %
NEUTROPHILS # BLD AUTO: 4.74 K/UL
NEUTROPHILS NFR BLD AUTO: 61.5 %
NITRITE URINE: NEGATIVE
NONHDLC SERPL-MCNC: 86 MG/DL
PARATHYROID HORMONE INTACT: 161 PG/ML
PH URINE: 6
PHOSPHATE SERPL-MCNC: 3.4 MG/DL
PLATELET # BLD AUTO: 160 K/UL
POTASSIUM SERPL-SCNC: 4.3 MMOL/L
PROT SERPL-MCNC: 6.9 G/DL
PROT UR-MCNC: 65 MG/DL
PROTEIN URINE: ABNORMAL
RBC # BLD: 3.69 M/UL
RBC # FLD: 13.3 %
SODIUM SERPL-SCNC: 144 MMOL/L
SPECIFIC GRAVITY URINE: 1.02
TRIGL SERPL-MCNC: 81 MG/DL
TSH SERPL-ACNC: 2.46 UIU/ML
UROBILINOGEN URINE: NORMAL
VIT B12 SERPL-MCNC: 592 PG/ML
WBC # FLD AUTO: 7.71 K/UL

## 2023-01-03 LAB
CYSTATIN C SERPL-MCNC: 2.88 MG/L
GFR/BSA.PRED SERPLBLD CYS-BASED-ARV: 17 ML/MIN/1.73M2

## 2023-03-30 ENCOUNTER — TRANSCRIPTION ENCOUNTER (OUTPATIENT)
Age: 88
End: 2023-03-30

## 2023-04-06 ENCOUNTER — APPOINTMENT (OUTPATIENT)
Dept: NEPHROLOGY | Facility: CLINIC | Age: 88
End: 2023-04-06

## 2023-04-06 ENCOUNTER — NON-APPOINTMENT (OUTPATIENT)
Age: 88
End: 2023-04-06

## 2023-04-07 ENCOUNTER — APPOINTMENT (OUTPATIENT)
Dept: NEPHROLOGY | Facility: CLINIC | Age: 88
End: 2023-04-07

## 2023-09-18 DIAGNOSIS — R79.9 ABNORMAL FINDING OF BLOOD CHEMISTRY, UNSPECIFIED: ICD-10-CM

## 2023-10-06 ENCOUNTER — APPOINTMENT (OUTPATIENT)
Dept: NEPHROLOGY | Facility: CLINIC | Age: 88
End: 2023-10-06
Payer: MEDICARE

## 2023-10-06 VITALS — DIASTOLIC BLOOD PRESSURE: 86 MMHG | SYSTOLIC BLOOD PRESSURE: 136 MMHG | HEART RATE: 56 BPM

## 2023-10-06 DIAGNOSIS — N18.9 CHRONIC KIDNEY DISEASE, UNSPECIFIED: ICD-10-CM

## 2023-10-06 DIAGNOSIS — Q61.3 POLYCYSTIC KIDNEY, UNSPECIFIED: ICD-10-CM

## 2023-10-06 DIAGNOSIS — N28.1 CYST OF KIDNEY, ACQUIRED: ICD-10-CM

## 2023-10-06 DIAGNOSIS — N18.4 CHRONIC KIDNEY DISEASE, STAGE 4 (SEVERE): ICD-10-CM

## 2023-10-06 DIAGNOSIS — E21.3 HYPERPARATHYROIDISM, UNSPECIFIED: ICD-10-CM

## 2023-10-06 DIAGNOSIS — I10 ESSENTIAL (PRIMARY) HYPERTENSION: ICD-10-CM

## 2023-10-06 PROCEDURE — 36415 COLL VENOUS BLD VENIPUNCTURE: CPT

## 2023-10-06 PROCEDURE — 99214 OFFICE O/P EST MOD 30 MIN: CPT | Mod: 25

## 2023-10-06 RX ORDER — AMLODIPINE BESYLATE 5 MG/1
5 TABLET ORAL DAILY
Qty: 90 | Refills: 3 | Status: DISCONTINUED | COMMUNITY
Start: 2021-05-05 | End: 2023-10-06

## 2023-10-06 RX ORDER — HYDRALAZINE HYDROCHLORIDE 10 MG/1
10 TABLET ORAL
Refills: 0 | Status: ACTIVE | COMMUNITY

## 2024-04-12 ENCOUNTER — APPOINTMENT (OUTPATIENT)
Dept: NEPHROLOGY | Facility: CLINIC | Age: 89
End: 2024-04-12

## 2024-04-12 ENCOUNTER — TRANSCRIPTION ENCOUNTER (OUTPATIENT)
Age: 89
End: 2024-04-12